# Patient Record
Sex: FEMALE | Race: WHITE | NOT HISPANIC OR LATINO | Employment: FULL TIME | URBAN - METROPOLITAN AREA
[De-identification: names, ages, dates, MRNs, and addresses within clinical notes are randomized per-mention and may not be internally consistent; named-entity substitution may affect disease eponyms.]

---

## 2018-11-28 ENCOUNTER — TELEPHONE (OUTPATIENT)
Dept: UROLOGY | Facility: CLINIC | Age: 58
End: 2018-11-28

## 2018-11-28 NOTE — TELEPHONE ENCOUNTER
----- Message from Cami Miller sent at 11/28/2018  9:06 AM CST -----  Contact: pt  ..Type: Needs Medical Advice    Who Called:  pt  Best Call Back Number:   Additional Information: Pt called to speak with a nurse to see if referral was received from Dr Andrew Morse. Please leave message if no answer  Please call pt to advise  Thanks

## 2018-12-05 ENCOUNTER — LAB VISIT (OUTPATIENT)
Dept: LAB | Facility: HOSPITAL | Age: 58
End: 2018-12-05
Attending: UROLOGY
Payer: COMMERCIAL

## 2018-12-05 ENCOUNTER — OFFICE VISIT (OUTPATIENT)
Dept: UROLOGY | Facility: CLINIC | Age: 58
End: 2018-12-05
Payer: COMMERCIAL

## 2018-12-05 VITALS
HEIGHT: 66 IN | BODY MASS INDEX: 28.61 KG/M2 | HEART RATE: 88 BPM | WEIGHT: 178 LBS | DIASTOLIC BLOOD PRESSURE: 68 MMHG | TEMPERATURE: 98 F | SYSTOLIC BLOOD PRESSURE: 113 MMHG

## 2018-12-05 DIAGNOSIS — N39.0 URINARY TRACT INFECTION WITHOUT HEMATURIA, SITE UNSPECIFIED: Primary | ICD-10-CM

## 2018-12-05 DIAGNOSIS — N39.0 URINARY TRACT INFECTION WITHOUT HEMATURIA, SITE UNSPECIFIED: ICD-10-CM

## 2018-12-05 LAB
BILIRUB SERPL-MCNC: NEGATIVE MG/DL
BLOOD URINE, POC: NEGATIVE
BUN SERPL-MCNC: 12 MG/DL
COLOR, POC UA: NORMAL
CREAT SERPL-MCNC: 0.7 MG/DL
EST. GFR  (AFRICAN AMERICAN): >60 ML/MIN/1.73 M^2
EST. GFR  (NON AFRICAN AMERICAN): >60 ML/MIN/1.73 M^2
GLUCOSE UR QL STRIP: NEGATIVE
KETONES UR QL STRIP: NEGATIVE
LEUKOCYTE ESTERASE URINE, POC: NEGATIVE
NITRITE, POC UA: NEGATIVE
PH, POC UA: 6
POC RESIDUAL URINE VOLUME: 25 ML (ref 0–100)
PROTEIN, POC: NEGATIVE
SPECIFIC GRAVITY, POC UA: 1010
UROBILINOGEN, POC UA: NEGATIVE

## 2018-12-05 PROCEDURE — 81002 URINALYSIS NONAUTO W/O SCOPE: CPT | Mod: S$GLB,,, | Performed by: UROLOGY

## 2018-12-05 PROCEDURE — 82565 ASSAY OF CREATININE: CPT

## 2018-12-05 PROCEDURE — 84520 ASSAY OF UREA NITROGEN: CPT

## 2018-12-05 PROCEDURE — 3008F BODY MASS INDEX DOCD: CPT | Mod: CPTII,S$GLB,, | Performed by: UROLOGY

## 2018-12-05 PROCEDURE — 99999 PR PBB SHADOW E&M-EST. PATIENT-LVL III: CPT | Mod: PBBFAC,,, | Performed by: UROLOGY

## 2018-12-05 PROCEDURE — 36415 COLL VENOUS BLD VENIPUNCTURE: CPT

## 2018-12-05 PROCEDURE — 51798 US URINE CAPACITY MEASURE: CPT | Mod: S$GLB,,, | Performed by: UROLOGY

## 2018-12-05 PROCEDURE — 99204 OFFICE O/P NEW MOD 45 MIN: CPT | Mod: 25,S$GLB,, | Performed by: UROLOGY

## 2018-12-05 RX ORDER — PHENOBARBITAL 30 MG/1
TABLET ORAL
COMMUNITY
End: 2019-02-25 | Stop reason: DRUGHIGH

## 2018-12-05 RX ORDER — LEVOTHYROXINE SODIUM 25 UG/1
TABLET ORAL
Refills: 0 | COMMUNITY
Start: 2018-09-10 | End: 2019-02-25 | Stop reason: DRUGHIGH

## 2018-12-05 RX ORDER — CIPROFLOXACIN 250 MG/1
TABLET, FILM COATED ORAL
Refills: 0 | COMMUNITY
Start: 2018-11-26 | End: 2019-02-25 | Stop reason: ALTCHOICE

## 2018-12-05 NOTE — Clinical Note
December 6, 2018      Andrew Mckeon MD  [address] [facility] [place] [address]           [facility] Urology  [address] [place] [address]  Phone: [phone]  Fax: [phone]          Patient: Tess Pearson   MR Number: [id number]   YOB: 1960   Date of Visit: 12/5/2018       Dear Dr. Andrew Mckeon:    Thank you for referring Tess Pearson to me for evaluation. Attached you will find relevant portions of my assessment and plan of care.    If you have questions, please do not hesitate to call me. I look forward to following Tess Pearson along with you.    Sincerely,    Norbert Cleaning MD    Enclosure  CC:  No Recipients    If you would like to receive this communication electronically, please contact [email or web address] or [phone] to request more information on [employer] access.    For providers and/or their staff who would like to refer a patient to [facility], please contact us through our one-stop-shop provider referral line, [facility] , at [phone].    If you feel you have received this communication in error or would no longer like to receive these types of communications, please e-mail [email or web address]

## 2018-12-06 NOTE — PROGRESS NOTES
Subjective:       Patient ID: Tess Pearson is a 58 y.o. female.    Chief Complaint:   OFFICE NOTE    This is a consultation with Dr. Andrew Mckeon.    CHIEF COMPLAINT:   1.  Chronic recurrent urinary tract infections.  2.  Back pain.    Ms. Pearson is a 58-year-old female who referred that in the last year had  numerous urinary tract infections more than 4.  The infections are usually  characterized by back pain.  She refers to have usually minimal dysuria and  denies any gross hematuria.  The flow seems to be adequate and she feels  that not always can empty the bladder satisfactorily.    The past genitourinary history is completely negative.    In the past gyn history, she has two pregnancies and two vaginal deliveries  that were uneventful.     It is to be noted that in a urine culture performed on 11/20/2018, she had  a positive culture for Enterococcus with multiple resistances.  The  Enterococcus is sensitive to nitrofurantoin.    PAST MEDICAL AND SURGICAL HISTORY:  Well documented in the medical record  and they were reviewed by me during this visit.  Ms. Pearson is a fairly  healthy, takes only few medications.  On further questioning the patient,  it looks that the hygiene of the urethra, genitalia, and rectum are  performed appropriately.  Today, urinalysis is clear and today the patient  referred that feels to have no evidence of any infection.        EOR/HN dd: 12/06/2018 14:03:04 (CST)   td: 12/06/2018 21:31:12 (CST)  Doc ID #[id number]   Job ID #[id number]    CC: Andrew Mckeon M.D.           [facility]  Review of Systems   Constitutional: Negative.  Negative for activity change.   HENT: Negative.  Negative for facial swelling.    Eyes: Negative for discharge.   Respiratory: Negative for cough and shortness of breath.    Cardiovascular: Negative for chest pain and palpitations.   Gastrointestinal: Negative for abdominal distention, blood in stool and constipation.   Genitourinary: Negative for dysuria, flank pain,  frequency, hematuria, urgency and vaginal pain.   Musculoskeletal: Negative.  Negative for arthralgias.   Skin: Negative.    Neurological: Negative.  Negative for dizziness.   Hematological: Negative for adenopathy.   Psychiatric/Behavioral: The patient is not nervous/anxious.        Objective:      Physical Exam   Constitutional: She appears well-developed.   HENT:   Head: Normocephalic.   Eyes: Pupils are equal, round, and reactive to light.   Neck: Normal range of motion.   Cardiovascular: Normal rate.    Pulmonary/Chest: Effort normal.   Abdominal: Soft. She exhibits no distension and no mass. There is no tenderness. Hernia confirmed negative in the right inguinal area and confirmed negative in the left inguinal area.   Genitourinary: Vagina normal. No erythema, tenderness or bleeding in the vagina.   Musculoskeletal: Normal range of motion.   Neurological: She is alert.   Skin: Skin is warm.     Psychiatric: She has a normal mood and affect.     PVR: 10 cc  Assessment:       1. Urinary tract infection without hematuria, site unspecified        Plan:       Urinary tract infection without hematuria, site unspecified  -     POCT URINE DIPSTICK WITHOUT MICROSCOPE  -     Creatinine, serum; Future; Expected date: 12/05/2018  -     CT Abdomen Pelvis W Wo Contrast; Future; Expected date: 12/05/2018  -     BUN; Future; Expected date: 12/05/2018  -     POCT Bladder Scan    Will review above testing, she may need cystoscopy.

## 2018-12-10 ENCOUNTER — HOSPITAL ENCOUNTER (OUTPATIENT)
Dept: RADIOLOGY | Facility: HOSPITAL | Age: 58
Discharge: HOME | End: 2018-12-10
Attending: UROLOGY
Payer: COMMERCIAL

## 2018-12-10 DIAGNOSIS — N39.0 URINARY TRACT INFECTION WITHOUT HEMATURIA, SITE UNSPECIFIED: ICD-10-CM

## 2018-12-11 ENCOUNTER — TELEPHONE (OUTPATIENT)
Dept: UROLOGY | Facility: CLINIC | Age: 58
End: 2018-12-11

## 2018-12-11 NOTE — TELEPHONE ENCOUNTER
----- Message from Shayla Berg sent at 12/11/2018  4:16 PM CST -----  Contact: patient  Type:  Patient Returning Call    Who Called:  patient  Who Left Message for Patient:  Poly  Does the patient know what this is regarding?:  unsure  Best Call Back Number:  [phone]  Additional Information:  She is home for the day, please call back.  Thank you

## 2018-12-11 NOTE — TELEPHONE ENCOUNTER
Spoke with pt about seeing a nurse practitioner tomorrow due to MD having multiple surgeries. Pt states that is fine.

## 2018-12-12 ENCOUNTER — TELEPHONE (OUTPATIENT)
Dept: UROLOGY | Facility: CLINIC | Age: 58
End: 2018-12-12

## 2018-12-12 NOTE — TELEPHONE ENCOUNTER
Informed pt that we have not received the CT results yet. We should be getting them soon. I will call her once provider results the scan. Pt verbalized understanding.

## 2018-12-12 NOTE — TELEPHONE ENCOUNTER
----- Message from Renea Ordaz sent at 12/12/2018  3:30 PM CST -----  Contact: Self  Patient is requesting to speak to Poly about her test results and if she found her record     Please call back [phone] (home)

## 2018-12-14 ENCOUNTER — HOSPITAL ENCOUNTER (OUTPATIENT)
Dept: RADIOLOGY | Facility: HOSPITAL | Age: 58
Discharge: HOME | End: 2018-12-14
Attending: UROLOGY
Payer: COMMERCIAL

## 2018-12-14 DIAGNOSIS — N39.0 URINARY TRACT INFECTION WITHOUT HEMATURIA, SITE UNSPECIFIED: ICD-10-CM

## 2018-12-14 PROCEDURE — 25500020 PHARM REV CODE 255: Performed by: UROLOGY

## 2018-12-14 RX ADMIN — IOHEXOL 120 ML: 350 INJECTION, SOLUTION INTRAVENOUS at 03:12

## 2018-12-17 ENCOUNTER — TELEPHONE (OUTPATIENT)
Dept: UROLOGY | Facility: CLINIC | Age: 58
End: 2018-12-17

## 2018-12-17 NOTE — TELEPHONE ENCOUNTER
----- Message from Analy Walker sent at 12/17/2018  9:57 AM CST -----  Contact: pt  Pt states that she is calling to speak to Nurse Poly about test done last Monday and she is calling to find out what is going on,she has the results and wants to know what the last step is to do,no one has called back about the the result,she doesn't understand some of the results. Had an appointment last week was cancel didn't have the results back yet..[phone] or [phone]

## 2018-12-17 NOTE — TELEPHONE ENCOUNTER
Notified pt of providers result notes. Pt verbalized understanding and voices no concerns at this time.

## 2018-12-27 ENCOUNTER — TELEPHONE (OUTPATIENT)
Dept: UROLOGY | Facility: CLINIC | Age: 58
End: 2018-12-27

## 2018-12-27 NOTE — TELEPHONE ENCOUNTER
Pt states that Dr. Cleaning is not in her insurance network so she is switching to DR. Tarango so she will not have as big of a bill. Informed pt I will let provider know.

## 2018-12-27 NOTE — TELEPHONE ENCOUNTER
----- Message from Renea Ordaz sent at 12/27/2018  1:20 PM CST -----  Contact: Self  Type:  Patient Returning Call    Who Called:  Patient   Who Left Message for Patient:  Poly  Does the patient know what this is regarding?:    Best Call Back Number:  [phone] (home)     Additional Information:

## 2019-01-18 ENCOUNTER — TELEPHONE (OUTPATIENT)
Dept: UROLOGY | Facility: CLINIC | Age: 59
End: 2019-01-18

## 2019-02-25 ENCOUNTER — OFFICE VISIT (OUTPATIENT)
Dept: UROLOGY | Facility: CLINIC | Age: 59
End: 2019-02-25
Payer: COMMERCIAL

## 2019-02-25 DIAGNOSIS — N20.0 NEPHROLITHIASIS: ICD-10-CM

## 2019-02-25 DIAGNOSIS — M54.50 MIDLINE LOW BACK PAIN WITHOUT SCIATICA, UNSPECIFIED CHRONICITY: ICD-10-CM

## 2019-02-25 DIAGNOSIS — N39.0 URINARY TRACT INFECTION WITHOUT HEMATURIA, SITE UNSPECIFIED: Primary | ICD-10-CM

## 2019-02-25 LAB
BILIRUB SERPL-MCNC: NORMAL MG/DL
BLOOD URINE, POC: NORMAL
COLOR, POC UA: YELLOW
GLUCOSE UR QL STRIP: NORMAL
KETONES UR QL STRIP: NORMAL
LEUKOCYTE ESTERASE URINE, POC: NORMAL
NITRITE, POC UA: NORMAL
PH, POC UA: 6
PROTEIN, POC: NORMAL
SPECIFIC GRAVITY, POC UA: 1.01
UROBILINOGEN, POC UA: 0.2

## 2019-02-25 PROCEDURE — 81002 POCT URINE DIPSTICK WITHOUT MICROSCOPE: ICD-10-PCS | Mod: S$GLB,,, | Performed by: UROLOGY

## 2019-02-25 PROCEDURE — 99215 OFFICE O/P EST HI 40 MIN: CPT | Mod: 25,S$GLB,, | Performed by: UROLOGY

## 2019-02-25 PROCEDURE — 81002 URINALYSIS NONAUTO W/O SCOPE: CPT | Mod: S$GLB,,, | Performed by: UROLOGY

## 2019-02-25 PROCEDURE — 99215 PR OFFICE/OUTPT VISIT, EST, LEVL V, 40-54 MIN: ICD-10-PCS | Mod: 25,S$GLB,, | Performed by: UROLOGY

## 2019-02-25 PROCEDURE — 99999 PR PBB SHADOW E&M-EST. PATIENT-LVL III: CPT | Mod: PBBFAC,,, | Performed by: UROLOGY

## 2019-02-25 PROCEDURE — 99999 PR PBB SHADOW E&M-EST. PATIENT-LVL III: ICD-10-PCS | Mod: PBBFAC,,, | Performed by: UROLOGY

## 2019-02-25 RX ORDER — LEVOTHYROXINE SODIUM 50 UG/1
75 CAPSULE ORAL DAILY
COMMUNITY
Start: 2018-12-28 | End: 2020-11-30 | Stop reason: DRUGHIGH

## 2019-02-25 RX ORDER — PHENOBARBITAL 32.4 MG/1
TABLET ORAL
Refills: 5 | COMMUNITY
Start: 2019-02-04

## 2019-02-25 NOTE — PATIENT INSTRUCTIONS
She's had recurrent uti's (culture proven?) but negative urinalysis with only symptoms of back pain and malodorous urines vs bacterial vaginosis (describes fishy smell). She empties her bladder (residual was 35). She has these symptoms today but urine is negative. No indication to send for a culture. No indication for cystocopy.     I recommend that she tries a probiotic daily to replenish the good bacteria in the vagina, continue to drink 3 to 4 bottles of water a day. I suspect that her lower back pain is musculoskeletal but may not be, the ct scan reveals no obvious source for utis or back pain    If she continues to have fish smell on underwear despite probiotics can return to see gyn for vaginal exam/discussion of estrace or however her gyn wants to proceed.     She does have a 2mm right renal stone and can get a f/u xray in a year and f/u then. Counseled when to return to ER or clinic. If she's passing stone can call clinic unless pain uncontrolled or having fever.     Referral to  for back pain    Follow-up in 1 year with xray and renal ultrasound

## 2019-02-25 NOTE — PROGRESS NOTES
[facility] [place]  Staff: MD Emelina    Referring provider and please cc: Andrew Mckeon  PCP: [facility]       Chief Complaint: recurrent uti?     Subjective:        HPI: Tess Pearson is a 58 y.o. female presents with     She was referred initially to  for recurrent uti from Andrew Mckeon. She states she was c/o R lower back pain that hurts with need to void with malodorous urine/foul smelling pad (but no vaginal discharge). no frequency or urgency,  Per patient, she stated she had 4 ua's that were negative but the cultures returned+.  ordered a ctap w wo 12/10/18 which showed a 2mm non-obstructing stone, no hydro on right. She's never passed a stone. She denies any external or musculoskeletal pain. She voids q2-3 hours and wears pads occasionally.  No increased pain with increased fluid intake.     Today she has pain in her lower back and urine is completely negative.    ua void: negative - today she has sx of lower back and malodorous urine  Urine history:  12/5/18 No cx, void: neg, pvr by scan: 25cc        ECOG Status: 0    G2, P 2,  no  Hysterectomy - sees  for pap smears   Gross HematuriaNo    REVIEW OF SYSTEMS:  General ROS: no fevers, no chills  Psychological ROS: no depression  Endocrine ROS: no heat or cold  Respiratory ROS: no SOB  Cardiovascular ROS: no CP  Gastrointestinal ROS: no abdominal pain, no constipation, no diarrhea, noBRBPR  Musculoskeletal ROS: no muscle pain  Neurological ROS: no headaches  Dermatological ROS: no rashes  HEENT: +glasses, no sinus   ROS: per HPI     PMHx:  Past Medical History:   Diagnosis Date    Epilepsy     Kidney stone     Thyroid disease      Kidney stones: Yes - 2mm right non-obstructing     PSHx:  Past Surgical History:   Procedure Laterality Date    ENDOMETRIAL ABLATION      gastric sleeve       Urologic or Gynecologic Surgery: endometrial ablation for vaginal  bleeding    Stents/Valves/Foreign Bodies:   Cardiologist:   Gynecologist: , pap smear yearly     Fam Hx:   malignancies: father with prostate cancer, gyn malignancies: No .   kidney stones: No     Soc Hx:  No tobacco.    No alcohol  Lives in [place], m[place]  :yes  Children: 2  Occupation: at [employer]     Allergies:  Cephalexin and Pseudoephedrine-guaifenesin   Keflex -constipation     Home Medications: reviewed   Urologic Medications: none  Anticoagulation: No    Objective:   There were no vitals filed for this visit.    General:WDWN in NAD  Eyes: PERRLA, normal conjunctiva  Respiratory: no increased work on breathing. No wheezing.   Cardiovascular: No obvious extremity edema. Warm and well perfused.   GI: no palpation of masses. No tenderness. No hepatosplenomegaly to palpation.  Musculoskeletal: normal range of motion of bilateral upper extremities. Normal muscle strength and tone.  Skin: no obvious rashes or lesions. No tightening of skin noted.  Neurologic: CN grossly normal. Normal sensation.   Psychiatric: awake, alert and oriented x 3. Mood and affect normal. Cooperative.    No TTP on lower back     Pelvic exam  Deferred     LABS REVIEW:    No results found for: WBC, HGB, HCT, MCV, PLT  BMP  Lab Results   Component Value Date    BUN 12 12/05/2018    CREATININE 0.7 12/05/2018    ESTGFRAFRICA >60.0 12/05/2018    EGFRNONAA >60.0 12/05/2018         PATHOLOGY REVIEW:  none    RADIOGRAPHIC REVIEW:  ctap w wo 12/10/18  1. Small hiatal hernia.  2. Prior gastric surgery.  3. Small nonobstructing right renal calculus.  4. Small right renal cyst.    Assessment:       1. Urinary tract infection without hematuria, site unspecified    2. Midline low back pain without sciatica, unspecified chronicity    3. Nephrolithiasis          Plan:     She's had recurrent uti's (culture proven?) but negative urinalysis with only symptoms of back pain and malodorous urines vs bacterial vaginosis  (describes fishy smell). She empties her bladder (residual was 35). She has these symptoms today but urine is negative. No indication to send for a culture. No indication for cystocopy.     I recommend that she tries a probiotic daily to replenish the good bacteria in the vagina, continue to drink 3 to 4 bottles of water a day. I suspect that her lower back pain is musculoskeletal but may not be, the ct scan reveals no obvious source for utis or back pain    If she continues to have fish smell on underwear despite probiotics can return to see gyn for vaginal exam/discussion of estrace or however her gyn wants to proceed.     She does have a 2mm right renal stone and can get a f/u xray in a year and f/u then. Counseled when to return to ER or clinic. If she's passing stone can call clinic unless pain uncontrolled or having fever.     Made referral to physical medicine for lower back pain since no urologic cause.     If she continues to have culture proven uti's, return sooner. Will ask  to send us results of ua and culture if she continues to complain of utis.     Follow-up in 1 year with xray and renal ultrasound    Routed Dr.Duplantier Sharri Tarango MD

## 2019-05-28 ENCOUNTER — TELEPHONE (OUTPATIENT)
Dept: UROLOGY | Facility: CLINIC | Age: 59
End: 2019-05-28

## 2019-05-28 NOTE — TELEPHONE ENCOUNTER
Informed pt that I would check into the progress on her billing issue and give her a call back. Pt verbalized understanding.

## 2019-05-28 NOTE — TELEPHONE ENCOUNTER
----- Message from Edith Luque sent at 5/28/2019 12:41 PM CDT -----  Contact: pt  Calling in regards with questions about insurance and billing and scheduling an appointment  And sent message on my chart last week and waiting to hear back from someone and  please advise [phone]

## 2019-05-29 ENCOUNTER — TELEPHONE (OUTPATIENT)
Dept: UROLOGY | Facility: CLINIC | Age: 59
End: 2019-05-29

## 2019-05-29 NOTE — TELEPHONE ENCOUNTER
----- Message from Stanley Knight sent at 5/29/2019 11:02 AM CDT -----  Contact: self   Patient want to speak with a nurse regarding her upcoming appointment have some questions please call back at [phone] (home) or [phone]

## 2019-05-29 NOTE — TELEPHONE ENCOUNTER
Informed pt that the appt was made just for our pre-service department to approve Dr. Cleaning on the [employer] network to disregard it unless needed. It will be canceled once approval is complete. Pt verbalized understanding.

## 2019-06-12 ENCOUNTER — OFFICE VISIT (OUTPATIENT)
Dept: PHYSICAL MEDICINE AND REHAB | Facility: CLINIC | Age: 59
End: 2019-06-12
Payer: COMMERCIAL

## 2019-06-12 ENCOUNTER — HOSPITAL ENCOUNTER (OUTPATIENT)
Dept: RADIOLOGY | Facility: HOSPITAL | Age: 59
Discharge: HOME | End: 2019-06-12
Attending: PHYSICAL MEDICINE & REHABILITATION
Payer: COMMERCIAL

## 2019-06-12 ENCOUNTER — TELEPHONE (OUTPATIENT)
Dept: PHYSICAL MEDICINE AND REHAB | Facility: CLINIC | Age: 59
End: 2019-06-12

## 2019-06-12 VITALS
HEART RATE: 67 BPM | SYSTOLIC BLOOD PRESSURE: 98 MMHG | DIASTOLIC BLOOD PRESSURE: 67 MMHG | HEIGHT: 66 IN | BODY MASS INDEX: 28.61 KG/M2 | WEIGHT: 178 LBS

## 2019-06-12 DIAGNOSIS — M53.3 SACROILIAC DYSFUNCTION: ICD-10-CM

## 2019-06-12 DIAGNOSIS — M53.3 SACROILIAC DYSFUNCTION: Primary | ICD-10-CM

## 2019-06-12 PROCEDURE — 72170 X-RAY EXAM OF PELVIS: CPT | Mod: 26,,, | Performed by: RADIOLOGY

## 2019-06-12 PROCEDURE — 72110 X-RAY EXAM L-2 SPINE 4/>VWS: CPT | Mod: 26,,, | Performed by: RADIOLOGY

## 2019-06-12 PROCEDURE — 99204 PR OFFICE/OUTPT VISIT, NEW, LEVL IV, 45-59 MIN: ICD-10-PCS | Mod: 25,S$GLB,, | Performed by: PHYSICAL MEDICINE & REHABILITATION

## 2019-06-12 PROCEDURE — 3008F BODY MASS INDEX DOCD: CPT | Mod: CPTII,S$GLB,, | Performed by: PHYSICAL MEDICINE & REHABILITATION

## 2019-06-12 PROCEDURE — 98925 PR OSTEOPATHIC MANIP,1-2 BODY REGN: ICD-10-PCS | Mod: S$GLB,,, | Performed by: PHYSICAL MEDICINE & REHABILITATION

## 2019-06-12 PROCEDURE — 72170 XR PELVIS ROUTINE AP: ICD-10-PCS | Mod: 26,,, | Performed by: RADIOLOGY

## 2019-06-12 PROCEDURE — 72110 X-RAY EXAM L-2 SPINE 4/>VWS: CPT | Mod: TC,FY

## 2019-06-12 PROCEDURE — 99999 PR PBB SHADOW E&M-EST. PATIENT-LVL III: ICD-10-PCS | Mod: PBBFAC,,, | Performed by: PHYSICAL MEDICINE & REHABILITATION

## 2019-06-12 PROCEDURE — 3008F PR BODY MASS INDEX (BMI) DOCUMENTED: ICD-10-PCS | Mod: CPTII,S$GLB,, | Performed by: PHYSICAL MEDICINE & REHABILITATION

## 2019-06-12 PROCEDURE — 98925 OSTEOPATH MANJ 1-2 REGIONS: CPT | Mod: S$GLB,,, | Performed by: PHYSICAL MEDICINE & REHABILITATION

## 2019-06-12 PROCEDURE — 99204 OFFICE O/P NEW MOD 45 MIN: CPT | Mod: 25,S$GLB,, | Performed by: PHYSICAL MEDICINE & REHABILITATION

## 2019-06-12 PROCEDURE — 99999 PR PBB SHADOW E&M-EST. PATIENT-LVL III: CPT | Mod: PBBFAC,,, | Performed by: PHYSICAL MEDICINE & REHABILITATION

## 2019-06-12 PROCEDURE — 72110 XR LUMBAR SPINE COMPLETE 5 VIEW: ICD-10-PCS | Mod: 26,,, | Performed by: RADIOLOGY

## 2019-06-12 PROCEDURE — 72170 X-RAY EXAM OF PELVIS: CPT | Mod: TC,FY

## 2019-06-12 NOTE — LETTER
June 12, 2019      Sharri Tarango MD  [address] [place] [address] Dr  [address]  [place] [address]           [place] [facility]  [address] [place] [address] Dr [address]  [place] [address]  Phone: [phone]  Fax: [phone]          Patient: Tess Pearson   MR Number: [id number]   YOB: 1960   Date of Visit: 6/12/2019       Dear Dr. Sharri Tarango:    Thank you for referring Tess Pearson to me for evaluation. Attached you will find relevant portions of my assessment and plan of care.    If you have questions, please do not hesitate to call me. I look forward to following Tess Pearson along with you.    Sincerely,    Josie Gar,     Enclosure  CC:  No Recipients    If you would like to receive this communication electronically, please contact [email or web address] or [phone] to request more information on [employer] Link access.    For providers and/or their staff who would like to refer a patient to [facility], please contact us through our one-stop-shop provider referral line, [facility] , at [phone].    If you feel you have received this communication in error or would no longer like to receive these types of communications, please e-mail [email or web address]

## 2019-06-12 NOTE — PROGRESS NOTES
HPI:  Patient is a 58 y.o. year old female being referred by urology. She has a history of kidney stones. She has had low back pain on the right side for several years.  She attributed her back pain to that. However, urology feels it is msk related and referred her here. She occasionally feels tingling in the anterior of her thighs. She does get pain on the lateral aspect of her right thigh. It is worse w. Pressure. She denies leg weakness or falls. She does have fam. H/o myasthenia gravis.    IMAGING  None    Labs  none    Past Medical History:   Diagnosis Date    Epilepsy     Kidney stone     Thyroid disease      Past Surgical History:   Procedure Laterality Date    ENDOMETRIAL ABLATION      gastric sleeve       No family history on file. +MYASTHENIA GRAVIS  Social History     Socioeconomic History    Marital status:      Spouse name: Not on file    Number of children: Not on file    Years of education: Not on file    Highest education level: Not on file   Occupational History    Not on file   Social Needs    Financial resource strain: Not on file    Food insecurity:     Worry: Not on file     Inability: Not on file    Transportation needs:     Medical: Not on file     Non-medical: Not on file   Tobacco Use    Smoking status: Never Smoker    Smokeless tobacco: Never Used   Substance and Sexual Activity    Alcohol use: No     Frequency: Never    Drug use: No    Sexual activity: Yes     Partners: Male   Lifestyle    Physical activity:     Days per week: Not on file     Minutes per session: Not on file    Stress: Not on file   Relationships    Social connections:     Talks on phone: Not on file     Gets together: Not on file     Attends Anabaptism service: Not on file     Active member of club or organization: Not on file     Attends meetings of clubs or organizations: Not on file     Relationship status: Not on file   Other Topics Concern    Not on file   Social History Narrative    Not  on file       Review of patient's allergies indicates:   Allergen Reactions    Cephalexin     Pseudoephedrine-guaifenesin        Current Outpatient Medications:     levothyroxine (TIROSINT) 50 mcg Cap, , Disp: , Rfl:     PHENobarbital (LUMINAL) 32.4 MG tablet, , Disp: , Rfl: 5      Review of Systems:    No nausea, vomiting, fevers, chills , contipation, diarrhea or sweats,+ weight change, occ neck stiffness, no chest pain, no sob, no change of bowel or bladder habits,no coordination issues      Physical Exam:      Vitals:    06/12/19 0955   BP: 98/67   Pulse: 67     alert and oriented ×4 follows commands answers all questions appropriately,affect wnl  Manual muscle test 5 out of 5 sensation to light touch grossly intact  + excruciate tenderness right sijt, greater trochanters, and QL  Nl gait  -slR left  -GUILLERMINA  Full hip ROM  babinsky down  No clonus  DTR's symmetric 2+  No C/C/E   + unleveled iliac crests  +standing flexion test  Poor psis mobility  +leg length discrepancy resolved after OMT           Assessment:  sijt dysfunction  Superimposed right gluteus medius tendinopathy    Plan:  OMT to realign pelvis today  Will schedule therapy for pelvic alignment at [place]  Recommended prp to right sijt - she will read pamphlet  Will get lspine and pelvis xray  rtc 4wks

## 2019-06-12 NOTE — TELEPHONE ENCOUNTER
----- Message from Vance Bennett sent at 6/12/2019 11:49 AM CDT -----  Type: Needs Medical Advice    Who Called:  Patient  Best Call Back Number: [phone] (home)   Additional Information: Patient would like a call back from the office

## 2019-06-14 ENCOUNTER — TELEPHONE (OUTPATIENT)
Dept: UROLOGY | Facility: CLINIC | Age: 59
End: 2019-06-14

## 2019-06-14 NOTE — TELEPHONE ENCOUNTER
----- Message from Corina Whyte sent at 6/14/2019  1:41 PM CDT -----  Contact: self  [phone]  States that she is calling speak to Marilyn to find out what Dr Cleaning's tax id number is that insurance is filled under. Please call back at [phone]//thank you acc

## 2019-06-14 NOTE — TELEPHONE ENCOUNTER
Informed pt that I will escalate this billing issue to the correct people for this issue to be solved. Pt verbalized understanding

## 2019-06-17 ENCOUNTER — CLINICAL SUPPORT (OUTPATIENT)
Dept: REHABILITATION | Facility: HOSPITAL | Age: 59
End: 2019-06-17
Payer: COMMERCIAL

## 2019-06-17 ENCOUNTER — TELEPHONE (OUTPATIENT)
Dept: PHYSICAL MEDICINE AND REHAB | Facility: CLINIC | Age: 59
End: 2019-06-17

## 2019-06-17 DIAGNOSIS — M62.81 MUSCLE WEAKNESS: ICD-10-CM

## 2019-06-17 PROCEDURE — 97161 PT EVAL LOW COMPLEX 20 MIN: CPT

## 2019-06-17 NOTE — PLAN OF CARE
Physical Therapy Evaluation/Plan of Care    Name: Tess Pearson  Clinic Number: [id number]    Diagnosis:   Encounter Diagnosis   Name Primary?    Muscle weakness      Physician: Josie Gar,*  Treatment Orders: PT Eval and Treat    Past Medical History:   Diagnosis Date    Epilepsy     Kidney stone     Thyroid disease      Current Outpatient Medications   Medication Sig    levothyroxine (TIROSINT) 50 mcg Cap     PHENobarbital (LUMINAL) 32.4 MG tablet      No current facility-administered medications for this visit.      Review of patient's allergies indicates:   Allergen Reactions    Cephalexin     Pseudoephedrine-guaifenesin      Precautions: standard    Evaluation Date: 06/17/19  Visit # authorized: 8  Authorization period: 12/31/19  Plan of care Expiration: 09/12/19    Subjective     Onset/OSMAR: insidious and gradual    Onset Date: chronic, >2 years  Prior Level of Function: independent ADL's and IADL's  Current level of Function: pain limiting functional mobility  Social History: patient lives in single story home with her   Med History: see above  Occupation:     History of Present Illness: Tess is a 58 y.o. female that presents to [facility] secondary to back pain. Tess states she has had progressive right sided back pain for several years with no known injury or inciting incident. She has a history of UTI's which is what she thought was causing pain however is has not gone away.  Tess reports increased stiffness in low back upon waking and with bending, improves some as the day goes on.  She was referred to Dr Gar by her urologist and has had no previous physical therapy or medical treatment for current condition.    Imaging: X-ray taken and revealed: No acute radiographic findings of the pelvis. 1. Mild to moderate multilevel degenerative disc disease.  2. Mild bone demineralization.    Pain: current 5/10, worst 9/10, best 3/10, Tight,  constant  Radicular symptoms: right buttocks  Aggravating factors: worse in AM, sitting too long, standing too long, bending  Easing factors: change in position    Pts goals: decrease pain and increase activity level    No cultural, environmental, or spiritual barriers identified to treatment or learning.    Objective     Observation: Patient is a well developed, well nourished female in NAD.  Posture:  Slouched FH/RS posture with dowagers hump, right iliac crest higher than left    Lumbar Range of Motion:    Degrees Pain   Flexion 40 degrees   Pain at end range        Extension 30 degrees   No change        Left Side Bending 22 degrees No change        Right Side Bending 20 degrees Increase pain        Left rotation   45 degrees No change        Right Rotation   45 degrees No change           Lower Extremity Strength  Right LE  Left LE    Knee extension: 5/5 Knee extension: 5/5   Knee flexion: 5/5 Knee flexion: 5/5   Hip flexion: 5/5 Hip flexion: 5/5   Hip extension:  4-/5 Hip extension: 4-/5   Hip abduction: 4+/5 Hip abduction: 4+/5   Hip adduction: 5/5 Hip adduction 5/5   Ankle dorsiflexion: 5/5 Ankle dorsiflexion: 5/5   Ankle plantarflexion: 5/5 Ankle plantarflexion: 5/5     Special Tests:    -Facet loading: pos on left  -Piriformis Test: B- neg  -Bridge Test: pos  -Fabers: R- pos, L- neg  -Fadirs: R- neg, L- neg  -Hip scour: B- neg  -Leg length discrepancy: pos on R    DTR:   Right Left Comment   Patellar (L3-4) 2+ 2+    Achilles (S1) 1+ 1+      Neuro Dynamic Testing:    Sciatic nerve:      SLR: R = neg     L = neg       Femoral Nerve:    Femoral nerve test: B- neg    Joint Mobility: decreased segmental mobility in lumbar spine and R SIJ.     Palpation: moderate ttp right SIJ. Right PSIS posterior and inferior    Sensation: gross sensation intact    Flexibility:  90/90 SLR = R moderate restriction, L moderate restriction   Ely's test: R moderate restriction, L moderate restriction   Bryan's test: R minimal  restriction, L minimal restriction   Danial test: R minimal restriction, L minimal restriction    Functional Limitations Reports   Tool: Mod Oswestery  Score: 26% impairment    PT Evaluation Completed: Yes  Discussed Plan of Care with patient: Yes    TREATMENT:  Initial eval only today, will initiate treatment at next scheduled visit.    Home Exercises and Patient Education Provided    Education provided re: use of ice/heat as needed at home for pain and tenderness  - progress towards goals   - role of therapy in multi - disciplinary team, goals for therapy  Pt educated on condition, POC, and expectations in therapy.  No spiritual or educational barriers to learning provided    Home exercises: none provided  Pt will be provided HEP during course of treatment with progressions as appropriate. Pt was advised to perform these exercises free of pain, and to stop performing them if pain occurs.   Tess demonstrated good understanding of the education provided.     Assessment     This is a 58 y.o. female referred to outpatient physical therapy and presents with a medical diagnosis of sacroiliac dysfunction and demonstrates limitations as described in the problem list. Pt will benefit from physcial therapy services in order to maximize pain free functional independence. The following goals were discussed with the patient and patient is in agreement with them as to be addressed in the treatment plan. Pt verbally understood the instructions as they were given and demonstrated proper form and technique during therapy. Pt was advise to perform these exercises free of pain, and to stop performing them if pain occurs.     Anticipated barriers to physical therapy: none identified    Medical necessity is demonstrated by the following IMPAIRMENTS/PROBLEM LIST:  Weakness, decreased flexibility, SIJ dysfunction and pain    GOALS:  Long Term Goals: 6 weeks  Pain: Decrease pain to 2/10 to allow for return to   Strength: Improve strength  in hip and core muscles to 5/5 for improved lumbosacral stability  ROM: Improve ROM to lumbar spine of normal limits   Functional scale: Improve score on Mod Oswestry to < / = 14%  Lifting: Lift 20 lbs to waist level, and carry for 20 feet without pain or compensation  Walking: Increase walking distance/duration to 1 mile without pain  Postures: Increase sitting and/or standing duration to 30 minutes without pain   Transfers: Perform supine<>sit and sit to stand transfers without increased pain or limitation  Exercise: demonstrate independence with home exercise program to maintain gains made in therapy.      Plan   Pt will be treated by physical therapy 1-2 times a week for 6 weeks for Pt Education, HEP, therapeutic exercises, neuromuscular re-education, joint mobilizations, modalities prn to achieve established goals. Tess may at times be seen by a PTA as part of the Rehab Team.     Cont PT for 6 weeks.     Cristian Gomez, PT    I certify the need for these services furnished under this plan of treatment and while under my care.______________________________ Physician/Referring Practitioner  Date of Signature

## 2019-06-17 NOTE — TELEPHONE ENCOUNTER
----- Message from Lana Chavez sent at 6/17/2019 10:31 AM CDT -----  Contact: Patient  Type: Needs Medical Advice    Who Called: Patient  Best Call Back Number:  call after 12 pm, she is headed to Therapy  Additional Information: Calling to reschedule her appt on 7/18/19. Please advise

## 2019-06-21 ENCOUNTER — CLINICAL SUPPORT (OUTPATIENT)
Dept: REHABILITATION | Facility: HOSPITAL | Age: 59
End: 2019-06-21
Payer: COMMERCIAL

## 2019-06-21 DIAGNOSIS — M62.81 MUSCLE WEAKNESS: ICD-10-CM

## 2019-06-21 PROCEDURE — 97110 THERAPEUTIC EXERCISES: CPT

## 2019-06-21 NOTE — PATIENT INSTRUCTIONS
Isometric Abdominal        Lying on back with knees bent, tighten stomach by pressing elbows down. Hold ____ seconds.  Repeat ____ times per set. Do ____ sets per session. Do ____ sessions per day.     https://[email or web address]/1086     Copyright © [employer]. All rights reserved.   Bridging        Slowly raise buttocks from floor, keeping stomach tight.  Repeat ____ times per set. Do ____ sets per session. Do ____ sessions per day.     https://[email or web address]/1096     Copyright © [employer]. All rights reserved.   Isometric Gluteals        Tighten buttock muscles.  Repeat ____ times per set. Do ____ sets per session. Do ____ sessions per day.     https://[email or web address]/1126     Copyright © [employer]. All rights reserved.   Straight Leg Raise        Tighten stomach and slowly raise locked right leg ____ inches from floor.  Repeat ____ times per set. Do ____ sets per session. Do ____ sessions per day.     https://[email or web address]     Copyright © [employer]. All rights reserved.   Hamstring Stretch: Active        Support behind right knee. Starting with knee bent, attempt to straighten knee until a comfortable stretch is felt in back of thigh. Hold ____ seconds.  Repeat ____ times per set. Do ____ sets per session. Do ____ sessions per day.     https://"[employer]/158     Copyright © [employer]. All rights reserved.   Knee-to-Chest Stretch: Unilateral        With hand behind right knee, pull knee in to chest until a comfortable stretch is felt in lower back and buttocks. Keep back relaxed. Hold ____ seconds.  Repeat ____ times per set. Do ____ sets per session. Do ____ sessions per day.     https://mEgo/126     Copyright © [employer]. All rights reserved.   Pelvic Tilt        Flatten back by tightening stomach muscles and buttocks.  Repeat ____ times per set. Do ____ sets per session. Do ____ sessions per day.     https://mEgo/134     Copyright © [employer]. All rights reserved.   Strengthening: Hip Adduction - Isometric        With ball or folded pillow  between knees, squeeze knees together. Hold ____ seconds.  Repeat ____ times per set. Do ____ sets per session. Do ____ sessions per day.     [email or web address].us/612     Copyright © [employer]. All rights reserved.

## 2019-06-21 NOTE — PROGRESS NOTES
"                            Physical Therapy Daily Treatment Note   Name: Tess Pearson 1960  MRN: [id number]    Visit Date: 6/21/2019  Visit #: 2 / 8  Authorization period Expiration: 12/31/10    Plan of Care Expiration: 09/12/19  Precautions: standard    Time In: 11:00  Time Out: 12:00  Total 1:1 Treatment Time: 45 min    Treatment Diagnosis:   Encounter Diagnosis   Name Primary?    Muscle weakness      Physician: Josie Gar,*    Subjective   Pt reports: She has been doing better since last visit, pain is 3/10. Still stiff in the morning when she first gets up but improves as day goes on. She is also planning on starting back on walking program.     Pain Scale:  3/10 on VAS currently, 0/10 on VAS post treatment  Pain Location: right low back    Objective   Tess received therapeutic exercises to develop strength, endurance, ROM, flexibility, posture and core stabilization for 40 minutes including:  Supine HS stretch with 20" hold x 5  SKC x 10  DKC with PB x 10  Pelvic tilt x 10  Bridging x 10  Hip add with ball x 10  TA activation with 5" hold x 10  Supine glut sets 5" hold x10  Tiny steps with abd brace x 10  Resisted hip abd with GTB with 5" hold x 10   Prone hip ext x 10   Supine resisted R hip ext with 5" hold x 10    Home Exercises and Education Provided     Education provided re: use of ice/heat as needed at home for pain and tenderness  - progress towards goals   - role of therapy in multi - disciplinary team, goals for therapy  Pt educated on condition, POC, and expectations in therapy.  No spiritual or educational barriers to learning provided     Home exercises: see patient instructions in Epic; TA activation, bridging, SKC, SLR, pelvic tilt, supine glut sets, supine active HS stretch, isometric hip add  Pt will be provided HEP during course of treatment with progressions as appropriate. Pt was advised to perform these exercises free of pain, and to stop performing them if pain occurs. "   Tess demonstrated good understanding of the education provided.     Assessment   Tess tolerated exercise well with good SIJ alignment achieved at end of treatment session. Patient reporting no pain just muscle soreness upon departure.     Pt prognosis is Good. Pt will continue to benefit from skilled outpatient physical therapy to address the deficits listed in the problem list chart on initial evaluation, provide pt/family education and to maximize pt's level of independence in the home and community environment.     Medical necessity is demonstrated by the impairments and functional limitations listed on the Initial Evaluation.     Anticipated barriers to physical therapy: none identified  Pt's spiritual, cultural and educational needs considered and pt agreeable to plan of care and goals.    Plan   Continue with established Plan of Care towards Physical Therapy goals.   Discussed Plan of Care with patient: Yes    Cristian Gomez, PT

## 2019-06-24 ENCOUNTER — CLINICAL SUPPORT (OUTPATIENT)
Dept: REHABILITATION | Facility: HOSPITAL | Age: 59
End: 2019-06-24
Payer: COMMERCIAL

## 2019-06-24 DIAGNOSIS — M62.81 MUSCLE WEAKNESS: ICD-10-CM

## 2019-06-24 PROCEDURE — 97110 THERAPEUTIC EXERCISES: CPT

## 2019-06-24 NOTE — PROGRESS NOTES
"                            Physical Therapy Daily Treatment Note   Name: Tess Pearson 1960  MRN: [id number]    Visit Date: 6/24/2019  Visit #: 3 / 8  Authorization period Expiration: 12/31/10    Plan of Care Expiration: 09/12/19  Precautions: standard    Time In: 10:00  Time Out: 11:00  Total 1:1 Treatment Time: 50 min    Treatment Diagnosis:   Encounter Diagnosis   Name Primary?    Muscle weakness      Physician: Josie Gar,*    Subjective   Pt reports: She is not as stiff in the mornings now and she does not have the pain going across her low back like she was, its more on the right side. Tess reports compliance with HEP.     Pain Scale:  1-2/10 on VAS currently, 0/10 on VAS post treatment  Pain Location: right low back    Objective   Tess received therapeutic exercises to develop strength, endurance, ROM, flexibility, posture and core stabilization for 50 minutes including:  Nustep Lv 1 x 10 minutes  Supine HS stretch with 20" hold x 5  SKC x 15  DKC with PB x 15  Pelvic tilt x 15  Bridging with GTB x 15  Hip add with ball x 15  TA activation with 5" hold x 15  Supine glut sets 5" hold x 15  Tiny steps with abd brace x 15  Resisted alt hip abd with RTB in hooklying x 10  Prone hip ext x 15   Supine resisted R hip ext with 5" hold x 10    Home Exercises and Education Provided     Education provided re: use of ice/heat as needed at home for pain and tenderness, postural care/correction and body mechanics,  - progress towards goals   - role of therapy in multi - disciplinary team, goals for therapy  Pt educated on condition, POC, and expectations in therapy.  No spiritual or educational barriers to learning provided     Home exercises: see patient instructions in Epic; TA activation, bridging, SKC, SLR, pelvic tilt, supine glut sets, supine active HS stretch, isometric hip add  Pt will be provided HEP during course of treatment with progressions as appropriate. Pt was advised to perform these " exercises free of pain, and to stop performing them if pain occurs.   Tess demonstrated good understanding of the education provided.     Assessment   Tess tolerated exercise well progressing with exercise and reps as noted above. Good SIJ alignment at onset of treatment as well as at completion of treatment session. Patient reporting no pain just muscle soreness upon departure.     Pt prognosis is Good. Pt will continue to benefit from skilled outpatient physical therapy to address the deficits listed in the problem list chart on initial evaluation, provide pt/family education and to maximize pt's level of independence in the home and community environment.     Medical necessity is demonstrated by the impairments and functional limitations listed on the Initial Evaluation.     Anticipated barriers to physical therapy: none identified  Pt's spiritual, cultural and educational needs considered and pt agreeable to plan of care and goals.    Plan   Continue with established Plan of Care towards Physical Therapy goals.   Discussed Plan of Care with patient: Yes    Cristian Gomez, PT

## 2019-06-28 ENCOUNTER — CLINICAL SUPPORT (OUTPATIENT)
Dept: REHABILITATION | Facility: HOSPITAL | Age: 59
End: 2019-06-28
Payer: COMMERCIAL

## 2019-06-28 DIAGNOSIS — M62.81 MUSCLE WEAKNESS: ICD-10-CM

## 2019-06-28 PROCEDURE — 97110 THERAPEUTIC EXERCISES: CPT

## 2019-06-28 NOTE — PROGRESS NOTES
"                            Physical Therapy Daily Treatment Note   Name: Tess Pearson 1960  MRN: [id number]    Visit Date: 6/28/2019  Visit #: 4 / 8  Authorization period Expiration: 12/31/10    Plan of Care Expiration: 09/12/19  Precautions: standard    Time In: 10:00  Time Out: 11:00  Total 1:1 Treatment Time: 50 min    Treatment Diagnosis:   Encounter Diagnosis   Name Primary?    Muscle weakness      Physician: Josie Gar,*    Subjective   Pt reports: She is not as stiff in the mornings now and she does not have the pain going across her low back like she was, its more on the right side. Tess reports she has not been doing her exercises like she should.     Pain Scale:  2/10 on VAS currently, 0/10 on VAS post treatment  Pain Location: right low back    Objective   Tess received therapeutic exercises to develop strength, endurance, ROM, flexibility, posture and core stabilization for 50 minutes including:  Nustep Lv 1 x 10 minutes  Supine HS stretch with 20" hold x 5  SKC x 15  DKC with PB x 15  Pelvic tilt x 15  Bridging with GTB x 15  Hip add with ball x 15  TA activation with 5" hold x 15  Supine glut sets 5" hold x 15  Tiny steps with abd brace x 15  Resisted hip abd with GTB in hooklying x 15  Prone hip ext x 15   Supine resisted R hip ext with 5" hold x 10    Home Exercises and Education Provided     Education provided re: use of ice/heat as needed at home for pain and tenderness, postural care/correction and body mechanics,  - progress towards goals   - role of therapy in multi - disciplinary team, goals for therapy  Pt educated on condition, POC, and expectations in therapy.  No spiritual or educational barriers to learning provided     Home exercises: see patient instructions in Epic; TA activation, bridging, SKC, SLR, pelvic tilt, supine glut sets, supine active HS stretch, isometric hip add  Pt will be provided HEP during course of treatment with progressions as appropriate. Pt was " advised to perform these exercises free of pain, and to stop performing them if pain occurs.   Tess demonstrated good understanding of the education provided.     Assessment   Tess tolerated exercise without complication or increase in pain reported. Re enforced importance of consistency with HEP for carry over between treatment sessions and best rehab outcome. Good SIJ alignment at completion of treatment session and patient reporting no pain upon departure. Tses is progressing well towards her goals with no changes to goals at this time.    Pt prognosis is Good. Pt will continue to benefit from skilled outpatient physical therapy to address the deficits listed in the problem list chart on initial evaluation, provide pt/family education and to maximize pt's level of independence in the home and community environment.     Medical necessity is demonstrated by the impairments and functional limitations listed on the Initial Evaluation.     Anticipated barriers to physical therapy: none identified  Pt's spiritual, cultural and educational needs considered and pt agreeable to plan of care and goals.    Plan   Continue with established Plan of Care towards Physical Therapy goals.   Discussed Plan of Care with patient: Yes    Cristian Gomez, PT

## 2019-07-02 ENCOUNTER — CLINICAL SUPPORT (OUTPATIENT)
Dept: REHABILITATION | Facility: HOSPITAL | Age: 59
End: 2019-07-02
Payer: COMMERCIAL

## 2019-07-02 DIAGNOSIS — M62.81 MUSCLE WEAKNESS: ICD-10-CM

## 2019-07-02 PROCEDURE — 97110 THERAPEUTIC EXERCISES: CPT

## 2019-07-02 NOTE — PROGRESS NOTES
"                            Physical Therapy Daily Treatment Note   Name: Tess Pearson 1960  MRN: [id number]    Visit Date: 7/2/2019  Visit #: 5 / 8  Authorization period Expiration: 12/31/10    Plan of Care Expiration: 09/12/19  Precautions: standard    Time In: 1:10 PM  Time Out: 2:00 PM  Total 1:1 Treatment Time: 50 min    Treatment Diagnosis:   Encounter Diagnosis   Name Primary?    Muscle weakness      Physician: Josie Gar,*    Subjective   Pt reports: Pain used to be across entire lower back, but now, it is just on the right side. States she can actually bend over now. Before, pain was so bad that she couldn't put socks on.    Pain Scale:  1/10 on VAS currently, 0/10 on VAS post treatment  Pain Location: right low back    Objective   Tess received therapeutic exercises to develop strength, endurance, ROM, flexibility, posture and core stabilization for 50 minutes including:  Nustep Lv 1 x 10 minutes  Supine HS stretch with 20" hold x 5  SKC x 15  DKC with PB x 15  Pelvic tilt x 15  Bridging with GTB x 15  Hip add with ball with 5" hold x 15  TA activation with 5" hold x 15  Tiny steps x 15  hip abd  in sidelying x 10 ea side  Prone hip ext x 15 ea  Sit to stand x 10    Home Exercises and Education Provided     Education provided re: use of ice/heat as needed at home for pain and tenderness, postural care/correction and body mechanics,  - progress towards goals   - role of therapy in multi - disciplinary team, goals for therapy  Pt educated on condition, POC, and expectations in therapy.  No spiritual or educational barriers to learning provided     Home exercises: see patient instructions in Epic; TA activation, bridging, SKC, SLR, pelvic tilt, supine glut sets, supine active HS stretch, isometric hip add  Pt will be provided HEP during course of treatment with progressions as appropriate. Pt was advised to perform these exercises free of pain, and to stop performing them if pain occurs. "   Tess demonstrated good understanding of the education provided.     Assessment   Tess tolerated exercise without complication or increase in pain reported. Re enforced importance of consistency with HEP for carry over between treatment sessions and best rehab outcome. Good SIJ alignment at completion of treatment session and patient reporting no pain upon departure. Tess is progressing well towards her goals with no changes to goals at this time.    Pt prognosis is Good. Pt will continue to benefit from skilled outpatient physical therapy to address the deficits listed in the problem list chart on initial evaluation, provide pt/family education and to maximize pt's level of independence in the home and community environment.     Medical necessity is demonstrated by the impairments and functional limitations listed on the Initial Evaluation.     Anticipated barriers to physical therapy: none identified  Pt's spiritual, cultural and educational needs considered and pt agreeable to plan of care and goals.    Plan   Continue with established Plan of Care towards Physical Therapy goals.   Discussed Plan of Care with patient: Yes    Mookie Knight, PTA

## 2019-07-05 ENCOUNTER — CLINICAL SUPPORT (OUTPATIENT)
Dept: REHABILITATION | Facility: HOSPITAL | Age: 59
End: 2019-07-05
Payer: COMMERCIAL

## 2019-07-05 DIAGNOSIS — M62.81 MUSCLE WEAKNESS: ICD-10-CM

## 2019-07-05 PROCEDURE — 97110 THERAPEUTIC EXERCISES: CPT

## 2019-07-05 NOTE — PROGRESS NOTES
"                            Physical Therapy Daily Treatment Note   Name: Tess Pearson 1960  MRN: [id number]    Visit Date: 7/5/2019  Visit #: 5 / 8  Authorization period Expiration: 12/31/10    Plan of Care Expiration: 09/12/19  Precautions: standard    Time In: 11:00 AM  Time Out: 11:53 AM  Total 1:1 Treatment Time: 53 min    Treatment Diagnosis:   Encounter Diagnosis   Name Primary?    Muscle weakness      Physician: Josie Gar,*    Subjective   Pt reports: I think I'm going to have to get that procedure done anyway. It's not as bad as it used to be, but I don't think it's as good as it can be.   Pt referring to possible PRP.     Pain Scale:  1/10 on VAS currently, 0/10 on VAS post treatment  Pain Location: right low back    Objective   Tess received therapeutic exercises to develop strength, endurance, ROM, flexibility, posture and core stabilization for 53 minutes including:  Nustep Lv 3 x 10 minutes  Supine HS stretch with 20" hold x 5  SKC x 15  DKC with PB x 15 RTB  LTR with PB x 2 min  Pelvic tilt x 15  Bridging with GTB x 15  Hip add with ball with 5" hold x 15  TA activation with 5" hold x 15  Tiny steps 1 min   hip abd  in sidelying 2 x 10 ea side  Prone hip ext x 15 ea  Sit to stand x 10    Home Exercises and Education Provided     Education provided re: use of ice/heat as needed at home for pain and tenderness, postural care/correction and body mechanics,  - progress towards goals   - role of therapy in multi - disciplinary team, goals for therapy  Pt educated on condition, POC, and expectations in therapy.  No spiritual or educational barriers to learning provided     Home exercises: see patient instructions in Epic; TA activation, bridging, SKC, SLR, pelvic tilt, supine glut sets, supine active HS stretch, isometric hip add  Pt will be provided HEP during course of treatment with progressions as appropriate. Pt was advised to perform these exercises free of pain, and to stop " performing them if pain occurs.   Tess demonstrated good understanding of the education provided.     Assessment   Tess tolerated exercise without complication or increase in pain reported. Patient tolerating increased reps/resistance without complaint.     Pt prognosis is Good. Pt will continue to benefit from skilled outpatient physical therapy to address the deficits listed in the problem list chart on initial evaluation, provide pt/family education and to maximize pt's level of independence in the home and community environment.     Medical necessity is demonstrated by the impairments and functional limitations listed on the Initial Evaluation.     Anticipated barriers to physical therapy: none identified  Pt's spiritual, cultural and educational needs considered and pt agreeable to plan of care and goals.    Plan   Continue with established Plan of Care towards Physical Therapy goals.   Discussed Plan of Care with patient: Yes    Gabby Slaughter, PT

## 2019-07-08 ENCOUNTER — CLINICAL SUPPORT (OUTPATIENT)
Dept: REHABILITATION | Facility: HOSPITAL | Age: 59
End: 2019-07-08
Payer: COMMERCIAL

## 2019-07-08 DIAGNOSIS — M62.81 MUSCLE WEAKNESS: ICD-10-CM

## 2019-07-08 PROCEDURE — 97110 THERAPEUTIC EXERCISES: CPT

## 2019-07-09 ENCOUNTER — CLINICAL SUPPORT (OUTPATIENT)
Dept: REHABILITATION | Facility: HOSPITAL | Age: 59
End: 2019-07-09
Payer: COMMERCIAL

## 2019-07-09 DIAGNOSIS — M62.81 MUSCLE WEAKNESS: ICD-10-CM

## 2019-07-09 PROCEDURE — 97110 THERAPEUTIC EXERCISES: CPT

## 2019-07-09 NOTE — PATIENT INSTRUCTIONS
Functional Quadriceps: Sit to Stand        Sit on edge of chair, feet flat on floor. Stand upright, extending knees fully.  Repeat ____ times per set. Do ____ sets per session. Do ____ sessions per day.     [email or web address]     Copyright © [employer]. All rights reserved.   Strengthening: Hip Extension (Prone)        Tighten muscles on front of left thigh, then lift leg ____ inches from surface, keeping knee locked.  Repeat ____ times per set. Do ____ sets per session. Do ____ sessions per day.     [email or web address]     Copyright © [employer]. All rights reserved.   Strengthening: Hip Abduction (Side-Lying)        Tighten muscles on front of left thigh, then lift leg ____ inches from surface, keeping knee locked.   Repeat ____ times per set. Do ____ sets per session. Do ____ sessions per day.     [email or web address]://[email or web address]/622     Copyright © [employer]. All rights reserved.   Strengthening: Straight Leg Raise (Phase 1)        Tighten muscles on front of right thigh, then lift leg ____ inches from surface, keeping knee locked.   Repeat ____ times per set. Do ____ sets per session. Do ____ sessions per day.     [email or web address]://[email or web address]/614     Copyright © [employer]. All rights reserved.   Strengthening: Resisted Extension        Hold tubing in right hand, arm forward. Pull arm back, elbow straight.  Repeat ____ times per set. Do ____ sets per session. Do ____ sessions per day.     [email or web address]     Copyright © [employer]. All rights reserved.

## 2019-07-09 NOTE — PROGRESS NOTES
"                            Physical Therapy Daily Treatment Note   Name: Tess Pearson 1960  MRN: [id number]    Visit Date: 7/9/2019  Visit #: 8 / 8  Authorization period Expiration: 12/31/10    Plan of Care Expiration: 09/12/19  Precautions: standard    Time In: 11:00 AM  Time Out: 12:05 AM  Total 1:1 Treatment Time: 55 min    Treatment Diagnosis:   Encounter Diagnosis   Name Primary?    Muscle weakness      Physician: Josie Gar,*    Subjective   Pt reports: Her pain level has been consistently staying at 1/10 and she is able to manage symptoms with home exercise and walking activity. She reports she has been trying to be more consistent with her HEP. She will be unable to attend further visits this summer due to a planned trip and having grandchildren staying with her.    Pain Scale:  1/10 on VAS currently, 0/10 on VAS post treatment  Pain Location: right low back    Objective   Tess received therapeutic exercises to develop strength, endurance, ROM, flexibility, posture and core stabilization for 53 minutes including:  Nustep Lv 3 x 10 minutes  Supine HS stretch with 20" hold x 5  SKC x 15  DKC with PB x 15 RTB  LTR with PB x 2 min  Pelvic tilt x 15  Bridging with GTB x 15  Hip add with ball with 5" hold x 15  TA activation with 5" hold x 15  Tiny steps x 15   Hip abd  in sidelying x 15   SLR x 15  Prone hip ext x 15 ea  Sit to stand x 10  Supine resisted R hip ext with 5" hold x 10  B UE rows with black TB x 15  B shoulder ext with black TB x 15    Mod Oswestry: 18% impairment (initial score was 26%)    Home Exercises and Education Provided     Education provided re: use of ice/heat as needed at home for pain and tenderness, postural care/correction and body mechanics,  - progress towards goals   - role of therapy in multi - disciplinary team, goals for therapy  Pt educated on condition, POC, and expectations in therapy.  No spiritual or educational barriers to learning provided     Home " exercises: see patient instructions in Epic; TA activation, bridging, SKC, SLR, pelvic tilt, supine glut sets, supine active HS stretch, isometric hip add. Added: SLR, hip abd in SL, prone hip extension, chair squats, and bilateral UE rows and shoulder ext with black TB.  Pt will be provided HEP during course of treatment with progressions as appropriate. Pt was advised to perform these exercises free of pain, and to stop performing them if pain occurs.   Tess demonstrated good understanding of the education provided.     Assessment   Tess tolerated exercise without complication or increase in pain reported. Patient will be unable to attend further physical therapy sessions this summer secondary to planned trips and having grandchildren staying with her but will follow up with referring MD on July 18th. She has made good progress towards her goals achieving 6 out of 9 goals set at initial eval. She continues to have good rehab potential and will continue to benefit from skilled physical therapy to address remaining goals, progress HEP, and maximize independence in home and community environment. Patient verbalized understanding of importance of consistency with HEP for symptom management as well as to maintain gains made in therapy. She also verbalized understanding of PT recommendation that she return to physical therapy for 2 more weeks when her schedule allows.    Medical necessity is demonstrated by the impairments and functional limitations listed on the Initial Evaluation.     Anticipated barriers to physical therapy: none identified  Pt's spiritual, cultural and educational needs considered and pt agreeable to plan of care and goals.    GOALS:  Long Term Goals: 6 weeks  Pain: Decrease pain to 2/10 to allow for return to unrestricted ADL's and recreational activities Goal met 07/09/19  Strength: Improve strength in hip and core muscles to 5/5 for improved lumbosacral stability  Goal not met 07/09/19  ROM:  Improve ROM to lumbar spine to normal limits  Goal met 07/09/19  Functional scale: Improve score on Mod Oswestry to < / = 14% Goal not met 07/09/19, patient scored 18% when tested today  Lifting: Lift 20 lbs to waist level, and carry for 20 feet without pain or compensation Goal not met 07/09/19  Walking: Increase walking distance/duration to 1 mile without pain  Goal met 07/09/19  Postures: Increase sitting and/or standing duration to 30 minutes without pain  Goal met 07/09/19  Transfers: Perform supine<>sit and sit to stand transfers without increased pain or limitationm Goal met 07/09/19  Exercise: demonstrate independence with home exercise program to maintain gains made in therapy.  Goal met 07/09/19     Plan   Patient will continue with established HEP with recommendations for continued physical therapy after follow up MD appointment later this month.  Discussed Plan of Care with patient: Yes    Cristian Gomez, PT

## 2019-07-18 ENCOUNTER — OFFICE VISIT (OUTPATIENT)
Dept: PHYSICAL MEDICINE AND REHAB | Facility: CLINIC | Age: 59
End: 2019-07-18
Payer: COMMERCIAL

## 2019-07-18 VITALS
WEIGHT: 177.94 LBS | SYSTOLIC BLOOD PRESSURE: 108 MMHG | HEIGHT: 66 IN | DIASTOLIC BLOOD PRESSURE: 68 MMHG | BODY MASS INDEX: 28.6 KG/M2 | HEART RATE: 83 BPM

## 2019-07-18 DIAGNOSIS — M25.551 RIGHT HIP PAIN: Primary | ICD-10-CM

## 2019-07-18 PROCEDURE — 99999 PR PBB SHADOW E&M-EST. PATIENT-LVL III: ICD-10-PCS | Mod: PBBFAC,,, | Performed by: PHYSICAL MEDICINE & REHABILITATION

## 2019-07-18 PROCEDURE — 99214 OFFICE O/P EST MOD 30 MIN: CPT | Mod: S$GLB,,, | Performed by: PHYSICAL MEDICINE & REHABILITATION

## 2019-07-18 PROCEDURE — 99214 PR OFFICE/OUTPT VISIT, EST, LEVL IV, 30-39 MIN: ICD-10-PCS | Mod: S$GLB,,, | Performed by: PHYSICAL MEDICINE & REHABILITATION

## 2019-07-18 PROCEDURE — 3008F BODY MASS INDEX DOCD: CPT | Mod: CPTII,S$GLB,, | Performed by: PHYSICAL MEDICINE & REHABILITATION

## 2019-07-18 PROCEDURE — 99999 PR PBB SHADOW E&M-EST. PATIENT-LVL III: CPT | Mod: PBBFAC,,, | Performed by: PHYSICAL MEDICINE & REHABILITATION

## 2019-07-18 PROCEDURE — 3008F PR BODY MASS INDEX (BMI) DOCUMENTED: ICD-10-PCS | Mod: CPTII,S$GLB,, | Performed by: PHYSICAL MEDICINE & REHABILITATION

## 2019-07-18 NOTE — PROGRESS NOTES
HPI:  Patient is a 58 y.o. year old female w. sijt dysfunction. She has felt some improvement w. Physical therapy. I had discussed PRP but she cannot afford this at this time.  She is still having pain radiating to her right buttocks    Past Medical History:   Diagnosis Date    Epilepsy     Kidney stone     Thyroid disease      Past Surgical History:   Procedure Laterality Date    ENDOMETRIAL ABLATION      gastric sleeve       No family history on file.  Social History     Socioeconomic History    Marital status:      Spouse name: Not on file    Number of children: Not on file    Years of education: Not on file    Highest education level: Not on file   Occupational History    Not on file   Social Needs    Financial resource strain: Not on file    Food insecurity:     Worry: Not on file     Inability: Not on file    Transportation needs:     Medical: Not on file     Non-medical: Not on file   Tobacco Use    Smoking status: Never Smoker    Smokeless tobacco: Never Used   Substance and Sexual Activity    Alcohol use: No     Frequency: Never    Drug use: No    Sexual activity: Yes     Partners: Male   Lifestyle    Physical activity:     Days per week: Not on file     Minutes per session: Not on file    Stress: Not on file   Relationships    Social connections:     Talks on phone: Not on file     Gets together: Not on file     Attends Alevism service: Not on file     Active member of club or organization: Not on file     Attends meetings of clubs or organizations: Not on file     Relationship status: Not on file   Other Topics Concern    Not on file   Social History Narrative    Not on file       Review of patient's allergies indicates:   Allergen Reactions    Cephalexin     Pseudoephedrine-guaifenesin        Current Outpatient Medications:     levothyroxine (TIROSINT) 50 mcg Cap, , Disp: , Rfl:     PHENobarbital (LUMINAL) 32.4 MG tablet, , Disp: , Rfl: 5      Review of Systems  No  nausea, vomiting, fevers, Chills , contipation, diarrhea or sweats    Physical Exam:      Vitals:    07/18/19 1335   BP: 108/68   Pulse: 83       alert and oriented ×4 follows commands answers all questions appropriately,affect wnl  Manual muscle test 5 out of 5 sensation to light touch grossly intact  +  tenderness right sijt and right iliac crest  Nl gait  -GUILLERMINA  Full hip ROM  babinsky down  No clonus  DTR's symmetric 2+  No C/C/E   + unleveled iliac crests  -standing flexion test  good psis mobility         Assessment:  sijt dysfunction  Superimposed cluneal neuralgia right    Plan:  Schedule Therapeutic diagnostic block+hydrodissection of right cluneal nerve  Cont physical therapy

## 2020-09-22 DIAGNOSIS — R74.8 ABNORMAL SERUM LEVEL OF ALKALINE PHOSPHATASE: Primary | ICD-10-CM

## 2020-09-28 ENCOUNTER — HOSPITAL ENCOUNTER (OUTPATIENT)
Dept: RADIOLOGY | Facility: HOSPITAL | Age: 60
Discharge: HOME | End: 2020-09-28
Attending: FAMILY MEDICINE
Payer: COMMERCIAL

## 2020-09-28 DIAGNOSIS — R74.8 ABNORMAL SERUM LEVEL OF ALKALINE PHOSPHATASE: ICD-10-CM

## 2020-09-28 PROCEDURE — 77080 DXA BONE DENSITY AXIAL: CPT | Mod: 26,,, | Performed by: RADIOLOGY

## 2020-09-28 PROCEDURE — 78306 BONE IMAGING WHOLE BODY: CPT | Mod: 26,,, | Performed by: RADIOLOGY

## 2020-09-28 PROCEDURE — 77080 DEXA BONE DENSITY SPINE HIP: ICD-10-PCS | Mod: 26,,, | Performed by: RADIOLOGY

## 2020-09-28 PROCEDURE — 77080 DXA BONE DENSITY AXIAL: CPT | Mod: TC

## 2020-09-28 PROCEDURE — A9503 TC99M MEDRONATE: HCPCS

## 2020-09-28 PROCEDURE — 78306 NM BONE SCAN WHOLE BODY: ICD-10-PCS | Mod: 26,,, | Performed by: RADIOLOGY

## 2020-12-11 ENCOUNTER — HOSPITAL ENCOUNTER (OUTPATIENT)
Dept: RADIOLOGY | Facility: HOSPITAL | Age: 60
Discharge: HOME | End: 2020-12-11
Attending: OBSTETRICS & GYNECOLOGY
Payer: COMMERCIAL

## 2020-12-11 ENCOUNTER — HOSPITAL ENCOUNTER (OUTPATIENT)
Dept: CARDIOLOGY | Facility: HOSPITAL | Age: 60
Discharge: HOME | End: 2020-12-11
Attending: OBSTETRICS & GYNECOLOGY
Payer: COMMERCIAL

## 2020-12-11 DIAGNOSIS — N84.0 ENDOMETRIAL POLYP: ICD-10-CM

## 2020-12-11 DIAGNOSIS — Z01.818 PREOP TESTING: ICD-10-CM

## 2020-12-11 PROCEDURE — 93005 ELECTROCARDIOGRAM TRACING: CPT

## 2020-12-11 NOTE — H&P (VIEW-ONLY)
Subjective:       Patient ID: Tess Pearson is a 60 y.o. female.    Chief Complaint:  Pre-op Exam      History of Present Illness  60-year-old patient here today for preop appointment.  Patient was having an enlarged uterus and a transvaginal ultrasound was performed.  She was noted to have a thickened endometrial stripe.  She was also noted to have fibroids.  She denies any postmenopausal bleeding.  It appears as though the patient has an endometrial polyp.  Consent signed and on chart.  Risks and benefits explained.  All questions were answered.  We will proceed with D&C hysteroscopy possible MyoSure.      Past Medical History:   Diagnosis Date    Epilepsy     Kidney stone     Thyroid disease      Past Surgical History:   Procedure Laterality Date    ENDOMETRIAL ABLATION      gastric sleeve       Social History     Tobacco Use    Smoking status: Never Smoker    Smokeless tobacco: Never Used   Substance Use Topics    Alcohol use: No     Frequency: Never    Drug use: No          Current Outpatient Medications:     furosemide (LASIX) 20 MG tablet, Take 1 tablet (20 mg total) by mouth once daily. for 10 days, Disp: 10 tablet, Rfl: 0    levothyroxine (SYNTHROID) 75 MCG tablet, Take 75 mcg by mouth once daily., Disp: , Rfl:     PHENobarbital (LUMINAL) 32.4 MG tablet, , Disp: , Rfl: 5   Review of patient's allergies indicates:   Allergen Reactions    Cephalexin     Pseudoephedrine-guaifenesin         GYN & OB History  No LMP recorded. Patient is postmenopausal.   Date of Last Pap: No result found    OB History   No obstetric history on file.       Review of Systems  Review of Systems   Constitutional: Negative for activity change, fatigue and unexpected weight change.   Eyes: Negative for visual disturbance.   Respiratory: Negative for shortness of breath.    Cardiovascular: Negative for chest pain and palpitations.   Gastrointestinal: Negative for abdominal pain, bloating, blood in stool, constipation  and diarrhea.   Endocrine: Negative for hot flashes.   Genitourinary: Negative for bladder incontinence, decreased libido, hot flashes, urinary incontinence, postmenopausal bleeding and vaginal dryness.   Musculoskeletal: Negative for back pain, joint swelling and myalgias.   Integumentary:  Negative for hair changes, nipple discharge and breast skin changes.   Neurological: Negative for syncope and headaches.   Hematological: Negative for adenopathy. Does not bruise/bleed easily.   Psychiatric/Behavioral: Negative for depression and sleep disturbance. The patient is not nervous/anxious.    Breast: Negative for breast self exam, lump, nipple discharge and skin changes          Objective:    Physical Exam:   Constitutional: She is oriented to person, place, and time. She appears well-developed and well-nourished.    HENT:   Head: Normocephalic and atraumatic.    Eyes: EOM are normal.    Neck: Normal range of motion and full passive range of motion without pain. Neck supple.    Cardiovascular: Normal rate, regular rhythm and normal heart sounds.     Pulmonary/Chest: Effort normal and breath sounds normal.        Abdominal: Soft. Bowel sounds are normal.     Genitourinary:    Vagina normal.   There is no lesion on the right labia. There is no lesion on the left labia. Uterus is enlarged and hosting fibroids. Cervix is normal. Right adnexum displays no mass, no tenderness and no fullness. Left adnexum displays no mass, no tenderness and no fullness. Vaginal cuff normal.Cervix exhibits no motion tenderness.        Uterus Size: 10 cm   Musculoskeletal: Normal range of motion.       Neurological: She is alert and oriented to person, place, and time. She has normal strength.    Skin: Skin is warm and dry.    Psychiatric: She has a normal mood and affect. Her speech is normal and behavior is normal.          Assessment:        1. Preop testing    2. Fibroids, intramural    3. Endometrial polyp               Plan:      Preop  testing  -     CBC Auto Differential; Future; Expected date: 12/11/2020  -     X-Ray Chest 1 View Pre-OP; Future; Expected date: 12/11/2020  -     EKG 12-lead; Future  -     COVID-19 Routine Screening; Future; Expected date: 12/11/2020    Fibroids, intramural  -     Full code; Standing  -     Insert peripheral IV; Standing  -     Notify Physician - Potential Need of Opioid Reversal; Standing  -     Diet NPO; Standing    Endometrial polyp  -     CBC Auto Differential; Future; Expected date: 12/11/2020  -     X-Ray Chest 1 View Pre-OP; Future; Expected date: 12/11/2020  -     EKG 12-lead; Future  -     COVID-19 Routine Screening; Future; Expected date: 12/11/2020  -     Full code; Standing  -     Insert peripheral IV; Standing  -     Notify Physician - Potential Need of Opioid Reversal; Standing  -     Diet NPO; Standing    Other orders  -     Ambulate; Standing          Follow up in 5 days (on 12/16/2020) for D&C Hscope GTA.

## 2020-12-13 ENCOUNTER — LAB VISIT (OUTPATIENT)
Dept: FAMILY MEDICINE | Facility: CLINIC | Age: 60
End: 2020-12-13
Payer: COMMERCIAL

## 2020-12-13 DIAGNOSIS — Z01.818 PREOP TESTING: ICD-10-CM

## 2020-12-13 DIAGNOSIS — N84.0 ENDOMETRIAL POLYP: ICD-10-CM

## 2020-12-13 PROCEDURE — U0003 INFECTIOUS AGENT DETECTION BY NUCLEIC ACID (DNA OR RNA); SEVERE ACUTE RESPIRATORY SYNDROME CORONAVIRUS 2 (SARS-COV-2) (CORONAVIRUS DISEASE [COVID-19]), AMPLIFIED PROBE TECHNIQUE, MAKING USE OF HIGH THROUGHPUT TECHNOLOGIES AS DESCRIBED BY CMS-2020-01-R: HCPCS

## 2020-12-14 LAB — SARS-COV-2 RNA RESP QL NAA+PROBE: NOT DETECTED

## 2020-12-16 ENCOUNTER — HOSPITAL ENCOUNTER (OUTPATIENT)
Facility: HOSPITAL | Age: 60
Discharge: HOME | End: 2020-12-16
Attending: OBSTETRICS & GYNECOLOGY | Admitting: OBSTETRICS & GYNECOLOGY
Payer: COMMERCIAL

## 2020-12-16 ENCOUNTER — ANESTHESIA (OUTPATIENT)
Dept: SURGERY | Facility: HOSPITAL | Age: 60
End: 2020-12-16
Payer: COMMERCIAL

## 2020-12-16 ENCOUNTER — ANESTHESIA EVENT (OUTPATIENT)
Dept: SURGERY | Facility: HOSPITAL | Age: 60
End: 2020-12-16
Payer: COMMERCIAL

## 2020-12-16 DIAGNOSIS — Z01.818 PREOP TESTING: ICD-10-CM

## 2020-12-16 DIAGNOSIS — N84.0 ENDOMETRIAL POLYP: Primary | ICD-10-CM

## 2020-12-16 DIAGNOSIS — D25.1 FIBROIDS, INTRAMURAL: ICD-10-CM

## 2020-12-16 DIAGNOSIS — R93.89 THICKENED ENDOMETRIUM: ICD-10-CM

## 2020-12-16 DIAGNOSIS — N81.4 CYSTOCELE WITH UTERINE PROLAPSE: ICD-10-CM

## 2020-12-16 PROCEDURE — D9220A PRA ANESTHESIA: Mod: CRNA,,, | Performed by: NURSE ANESTHETIST, CERTIFIED REGISTERED

## 2020-12-16 PROCEDURE — 36000707: Performed by: OBSTETRICS & GYNECOLOGY

## 2020-12-16 PROCEDURE — 36000706: Performed by: OBSTETRICS & GYNECOLOGY

## 2020-12-16 PROCEDURE — 63600175 PHARM REV CODE 636 W HCPCS: Performed by: NURSE ANESTHETIST, CERTIFIED REGISTERED

## 2020-12-16 PROCEDURE — 25000003 PHARM REV CODE 250

## 2020-12-16 PROCEDURE — D9220A PRA ANESTHESIA: ICD-10-PCS | Mod: CRNA,,, | Performed by: NURSE ANESTHETIST, CERTIFIED REGISTERED

## 2020-12-16 PROCEDURE — 25000003 PHARM REV CODE 250: Performed by: OBSTETRICS & GYNECOLOGY

## 2020-12-16 PROCEDURE — 37000008 HC ANESTHESIA 1ST 15 MINUTES: Performed by: OBSTETRICS & GYNECOLOGY

## 2020-12-16 PROCEDURE — 71000033 HC RECOVERY, INTIAL HOUR: Performed by: OBSTETRICS & GYNECOLOGY

## 2020-12-16 PROCEDURE — 63600175 PHARM REV CODE 636 W HCPCS: Performed by: ANESTHESIOLOGY

## 2020-12-16 PROCEDURE — 37000009 HC ANESTHESIA EA ADD 15 MINS: Performed by: OBSTETRICS & GYNECOLOGY

## 2020-12-16 PROCEDURE — C1782 MORCELLATOR: HCPCS | Performed by: OBSTETRICS & GYNECOLOGY

## 2020-12-16 PROCEDURE — 88305 TISSUE EXAM BY PATHOLOGIST: CPT | Mod: 26,,, | Performed by: PATHOLOGY

## 2020-12-16 PROCEDURE — 71000015 HC POSTOP RECOV 1ST HR: Performed by: OBSTETRICS & GYNECOLOGY

## 2020-12-16 PROCEDURE — D9220A PRA ANESTHESIA: ICD-10-PCS | Mod: ANES,,, | Performed by: ANESTHESIOLOGY

## 2020-12-16 PROCEDURE — 27201423 OPTIME MED/SURG SUP & DEVICES STERILE SUPPLY: Performed by: OBSTETRICS & GYNECOLOGY

## 2020-12-16 PROCEDURE — 88305 TISSUE EXAM BY PATHOLOGIST: CPT | Performed by: PATHOLOGY

## 2020-12-16 PROCEDURE — 88305 TISSUE EXAM BY PATHOLOGIST: ICD-10-PCS | Mod: 26,,, | Performed by: PATHOLOGY

## 2020-12-16 PROCEDURE — D9220A PRA ANESTHESIA: Mod: ANES,,, | Performed by: ANESTHESIOLOGY

## 2020-12-16 RX ORDER — ONDANSETRON 2 MG/ML
INJECTION INTRAMUSCULAR; INTRAVENOUS
Status: DISCONTINUED | OUTPATIENT
Start: 2020-12-16 | End: 2020-12-16

## 2020-12-16 RX ORDER — MIDAZOLAM HYDROCHLORIDE 1 MG/ML
INJECTION INTRAMUSCULAR; INTRAVENOUS
Status: DISCONTINUED | OUTPATIENT
Start: 2020-12-16 | End: 2020-12-16

## 2020-12-16 RX ORDER — POLYETHYLENE GLYCOL 3350 17 G/17G
17 POWDER, FOR SOLUTION ORAL DAILY
Start: 2020-12-16 | End: 2021-07-01

## 2020-12-16 RX ORDER — IBUPROFEN 800 MG/1
800 TABLET ORAL EVERY 8 HOURS PRN
Qty: 30 TABLET | Refills: 1 | Status: SHIPPED | OUTPATIENT
Start: 2020-12-16 | End: 2021-07-01

## 2020-12-16 RX ORDER — DIPHENHYDRAMINE HCL 25 MG
25 CAPSULE ORAL EVERY 4 HOURS PRN
Status: DISCONTINUED | OUTPATIENT
Start: 2020-12-16 | End: 2020-12-16 | Stop reason: HOSPADM

## 2020-12-16 RX ORDER — MORPHINE SULFATE 4 MG/ML
2 INJECTION, SOLUTION INTRAMUSCULAR; INTRAVENOUS
Status: DISCONTINUED | OUTPATIENT
Start: 2020-12-16 | End: 2020-12-16 | Stop reason: HOSPADM

## 2020-12-16 RX ORDER — SODIUM CHLORIDE 9 MG/ML
INJECTION, SOLUTION INTRAVENOUS CONTINUOUS
Status: DISCONTINUED | OUTPATIENT
Start: 2020-12-16 | End: 2020-12-16 | Stop reason: HOSPADM

## 2020-12-16 RX ORDER — SODIUM CHLORIDE, SODIUM LACTATE, POTASSIUM CHLORIDE, CALCIUM CHLORIDE 600; 310; 30; 20 MG/100ML; MG/100ML; MG/100ML; MG/100ML
INJECTION, SOLUTION INTRAVENOUS CONTINUOUS
Status: DISCONTINUED | OUTPATIENT
Start: 2020-12-16 | End: 2020-12-16 | Stop reason: HOSPADM

## 2020-12-16 RX ORDER — DEXAMETHASONE SODIUM PHOSPHATE 4 MG/ML
INJECTION, SOLUTION INTRA-ARTICULAR; INTRALESIONAL; INTRAMUSCULAR; INTRAVENOUS; SOFT TISSUE
Status: DISCONTINUED | OUTPATIENT
Start: 2020-12-16 | End: 2020-12-16

## 2020-12-16 RX ORDER — FAMOTIDINE 10 MG/ML
20 INJECTION INTRAVENOUS ONCE
Status: DISCONTINUED | OUTPATIENT
Start: 2020-12-16 | End: 2020-12-16 | Stop reason: HOSPADM

## 2020-12-16 RX ORDER — PROPOFOL 10 MG/ML
VIAL (ML) INTRAVENOUS
Status: DISCONTINUED | OUTPATIENT
Start: 2020-12-16 | End: 2020-12-16

## 2020-12-16 RX ORDER — ONDANSETRON 2 MG/ML
4 INJECTION INTRAMUSCULAR; INTRAVENOUS DAILY PRN
Status: DISCONTINUED | OUTPATIENT
Start: 2020-12-16 | End: 2020-12-16 | Stop reason: HOSPADM

## 2020-12-16 RX ORDER — MORPHINE SULFATE 4 MG/ML
2 INJECTION, SOLUTION INTRAMUSCULAR; INTRAVENOUS EVERY 5 MIN PRN
Status: DISCONTINUED | OUTPATIENT
Start: 2020-12-16 | End: 2020-12-16 | Stop reason: HOSPADM

## 2020-12-16 RX ORDER — FAMOTIDINE 10 MG/ML
INJECTION INTRAVENOUS
Status: COMPLETED
Start: 2020-12-16 | End: 2020-12-16

## 2020-12-16 RX ORDER — SODIUM CHLORIDE, SODIUM LACTATE, POTASSIUM CHLORIDE, CALCIUM CHLORIDE 600; 310; 30; 20 MG/100ML; MG/100ML; MG/100ML; MG/100ML
125 INJECTION, SOLUTION INTRAVENOUS CONTINUOUS
Status: DISCONTINUED | OUTPATIENT
Start: 2020-12-16 | End: 2020-12-16 | Stop reason: HOSPADM

## 2020-12-16 RX ORDER — ONDANSETRON 4 MG/1
8 TABLET, ORALLY DISINTEGRATING ORAL EVERY 8 HOURS PRN
Status: DISCONTINUED | OUTPATIENT
Start: 2020-12-16 | End: 2020-12-16 | Stop reason: HOSPADM

## 2020-12-16 RX ORDER — HYDROMORPHONE HYDROCHLORIDE 2 MG/ML
1 INJECTION, SOLUTION INTRAMUSCULAR; INTRAVENOUS; SUBCUTANEOUS EVERY 4 HOURS PRN
Status: DISCONTINUED | OUTPATIENT
Start: 2020-12-16 | End: 2020-12-16 | Stop reason: HOSPADM

## 2020-12-16 RX ORDER — AMOXICILLIN 250 MG
1 CAPSULE ORAL 2 TIMES DAILY
COMMUNITY
Start: 2020-12-16 | End: 2021-07-01

## 2020-12-16 RX ORDER — MORPHINE SULFATE 10 MG/ML
INJECTION INTRAMUSCULAR; INTRAVENOUS; SUBCUTANEOUS
Status: DISCONTINUED | OUTPATIENT
Start: 2020-12-16 | End: 2020-12-16

## 2020-12-16 RX ORDER — DIPHENHYDRAMINE HYDROCHLORIDE 50 MG/ML
25 INJECTION INTRAMUSCULAR; INTRAVENOUS EVERY 4 HOURS PRN
Status: DISCONTINUED | OUTPATIENT
Start: 2020-12-16 | End: 2020-12-16 | Stop reason: HOSPADM

## 2020-12-16 RX ORDER — FAMOTIDINE 10 MG/ML
20 INJECTION INTRAVENOUS
Status: COMPLETED | OUTPATIENT
Start: 2020-12-16 | End: 2020-12-16

## 2020-12-16 RX ORDER — IBUPROFEN 600 MG/1
600 TABLET ORAL EVERY 6 HOURS PRN
Status: DISCONTINUED | OUTPATIENT
Start: 2020-12-16 | End: 2020-12-16 | Stop reason: HOSPADM

## 2020-12-16 RX ORDER — LIDOCAINE HYDROCHLORIDE 10 MG/ML
1 INJECTION, SOLUTION EPIDURAL; INFILTRATION; INTRACAUDAL; PERINEURAL ONCE
Status: DISCONTINUED | OUTPATIENT
Start: 2020-12-16 | End: 2020-12-16 | Stop reason: HOSPADM

## 2020-12-16 RX ORDER — SUCCINYLCHOLINE CHLORIDE 20 MG/ML
INJECTION INTRAMUSCULAR; INTRAVENOUS
Status: DISCONTINUED | OUTPATIENT
Start: 2020-12-16 | End: 2020-12-16

## 2020-12-16 RX ORDER — DOXYCYCLINE HYCLATE 100 MG
100 TABLET ORAL 2 TIMES DAILY
Status: COMPLETED | OUTPATIENT
Start: 2020-12-16 | End: 2020-12-16

## 2020-12-16 RX ORDER — DOXYCYCLINE HYCLATE 100 MG
200 TABLET ORAL ONCE
Status: DISCONTINUED | OUTPATIENT
Start: 2020-12-16 | End: 2020-12-16 | Stop reason: HOSPADM

## 2020-12-16 RX ORDER — MORPHINE SULFATE 4 MG/ML
3 INJECTION, SOLUTION INTRAMUSCULAR; INTRAVENOUS
Status: DISCONTINUED | OUTPATIENT
Start: 2020-12-16 | End: 2020-12-16 | Stop reason: HOSPADM

## 2020-12-16 RX ORDER — DOXYCYCLINE HYCLATE 100 MG
TABLET ORAL
Status: COMPLETED
Start: 2020-12-16 | End: 2020-12-16

## 2020-12-16 RX ORDER — OXYCODONE AND ACETAMINOPHEN 10; 325 MG/1; MG/1
1 TABLET ORAL EVERY 6 HOURS PRN
Qty: 14 TABLET | Refills: 0 | Status: SHIPPED | OUTPATIENT
Start: 2020-12-16 | End: 2021-07-01

## 2020-12-16 RX ORDER — DIPHENHYDRAMINE HYDROCHLORIDE 50 MG/ML
12.5 INJECTION INTRAMUSCULAR; INTRAVENOUS
Status: DISCONTINUED | OUTPATIENT
Start: 2020-12-16 | End: 2020-12-16 | Stop reason: HOSPADM

## 2020-12-16 RX ADMIN — DOXYCYCLINE HYCLATE 100 MG: 100 TABLET, COATED ORAL at 11:12

## 2020-12-16 RX ADMIN — DEXAMETHASONE SODIUM PHOSPHATE 8 MG: 4 INJECTION, SOLUTION INTRAMUSCULAR; INTRAVENOUS at 10:12

## 2020-12-16 RX ADMIN — PROPOFOL 200 MG: 10 INJECTION, EMULSION INTRAVENOUS at 10:12

## 2020-12-16 RX ADMIN — SUCCINYLCHOLINE CHLORIDE 100 MG: 20 INJECTION, SOLUTION INTRAMUSCULAR; INTRAVENOUS at 10:12

## 2020-12-16 RX ADMIN — FAMOTIDINE 20 MG: 10 INJECTION INTRAVENOUS at 09:12

## 2020-12-16 RX ADMIN — ONDANSETRON 4 MG: 2 INJECTION INTRAMUSCULAR; INTRAVENOUS at 10:12

## 2020-12-16 RX ADMIN — ONDANSETRON HYDROCHLORIDE 4 MG: 2 SOLUTION INTRAMUSCULAR; INTRAVENOUS at 11:12

## 2020-12-16 RX ADMIN — DOXYCYCLINE HYCLATE 100 MG: 100 TABLET, COATED ORAL at 09:12

## 2020-12-16 RX ADMIN — SODIUM CHLORIDE, POTASSIUM CHLORIDE, SODIUM LACTATE AND CALCIUM CHLORIDE: 600; 310; 30; 20 INJECTION, SOLUTION INTRAVENOUS at 10:12

## 2020-12-16 RX ADMIN — MIDAZOLAM HYDROCHLORIDE 2 MG: 1 INJECTION, SOLUTION INTRAMUSCULAR; INTRAVENOUS at 10:12

## 2020-12-16 RX ADMIN — MORPHINE SULFATE 2 MG: 10 INJECTION INTRAVENOUS at 10:12

## 2020-12-16 NOTE — TRANSFER OF CARE
"Anesthesia Transfer of Care Note    Patient: Tess Pearson    Procedure(s) Performed: Procedure(s) (LRB):  HYSTEROSCOPY, WITH DILATION AND CURETTAGE OF UTERUS (N/A)  POLYPECTOMY, UTERUS, HYSTEROSCOPIC (N/A)    Patient location: PACU    Anesthesia Type: general    Transport from OR: Transported from OR on room air with adequate spontaneous ventilation    Post pain: adequate analgesia    Post assessment: no apparent anesthetic complications    Post vital signs: stable    Level of consciousness: awake, alert and oriented    Nausea/Vomiting: no nausea/vomiting    Complications: none    Transfer of care protocol was followed      Last vitals:   Visit Vitals  /81   Pulse 64   Temp 36.4 °C (97.6 °F) (Oral)   Resp 13   Ht 5' 6" (1.676 m)   Wt 90.7 kg (200 lb)   SpO2 100%   Breastfeeding No   BMI 32.28 kg/m²     "

## 2020-12-16 NOTE — ANESTHESIA PREPROCEDURE EVALUATION
12/16/2020  Tess Pearson is a 60 y.o., female.    Anesthesia Evaluation    I have reviewed the Patient Summary Reports.    I have reviewed the Nursing Notes. I have reviewed the NPO Status.   I have reviewed the Medications.     Review of Systems  Social:  Non-Smoker    Hematology/Oncology:  Hematology Normal   Oncology Normal     EENT/Dental:EENT/Dental Normal   Cardiovascular:  Cardiovascular Normal     Pulmonary:  Pulmonary Normal    Neurological:   Seizures    Endocrine:   Hypothyroidism        Physical Exam  General:  Well nourished    Airway/Jaw/Neck:  Airway Findings: Mouth Opening: Normal Tongue: Normal  General Airway Assessment: Adult  Mallampati: II  TM Distance: Normal, at least 6 cm       Chest/Lungs:  Chest/Lungs Findings: Clear to auscultation     Heart/Vascular:  Heart Findings: Rate: Normal  Rhythm: Regular Rhythm        Mental Status:  Mental Status Findings:  Cooperative, Alert and Oriented         Anesthesia Plan  Type of Anesthesia, risks & benefits discussed:  Anesthesia Type:  general  Patient's Preference:   Intra-op Monitoring Plan: standard ASA monitors  Intra-op Monitoring Plan Comments:   Post Op Pain Control Plan: IV/PO Opioids PRN  Post Op Pain Control Plan Comments:   Induction:   IV  Beta Blocker:  Patient is not currently on a Beta-Blocker (No further documentation required).       Informed Consent: Patient understands risks and agrees with Anesthesia plan.  Questions answered. Anesthesia consent signed with patient.  ASA Score: 3     Day of Surgery Review of History & Physical: I have interviewed and examined the patient. I have reviewed the patient's H&P dated:            Ready For Surgery From Anesthesia Perspective.

## 2020-12-16 NOTE — BRIEF OP NOTE
[facility] - Periop Services  Brief Operative Note    Surgery Date: 12/16/2020     Surgeon(s) and Role:     * Andrew Mckeon MD - Primary    Assisting Surgeon: None    Pre-op Diagnosis:  Fibroids, intramural [D25.1]  Endometrial polyp [N84.0]  Thickened endometrium [R93.89]    Post-op Diagnosis:  Post-Op Diagnosis Codes:     * Fibroids, intramural [D25.1]     * Endometrial polyp [N84.0]     * Thickened endometrium [R93.89]    Procedure(s) (LRB):  HYSTEROSCOPY, WITH DILATION AND CURETTAGE OF UTERUS (N/A)  POLYPECTOMY, UTERUS, HYSTEROSCOPIC (N/A)    Anesthesia: Choice    Description of the findings of the procedure(s): The patient sounded to 10 cm.  The patient had significant uterus prolapse to the introitus with an accompanying cystocele.  There was a thickened portion of endometrium consistent with a flat polyp on the anterior surface of the uterus and near the left cornua.  There was also a thickened area on the posterior aspect of the uterus    Estimated Blood Loss: * 10cc *         Specimens:  ECC, EMC        Discharge Note    OUTCOME: Patient tolerated treatment/procedure well without complication and is now ready for discharge.    DISPOSITION: Home or Self Care    FINAL DIAGNOSIS:  Endometrial polyp    FOLLOWUP: In clinic    DISCHARGE INSTRUCTIONS:    Discharge Procedure Orders   Diet general     Call MD for:  temperature >100.4     Call MD for:  persistent nausea and vomiting     Call MD for:  severe uncontrolled pain     Call MD for:  difficulty breathing, headache or visual disturbances     Call MD for:  redness, tenderness, or signs of infection (pain, swelling, redness, odor or green/yellow discharge around incision site)

## 2020-12-16 NOTE — DISCHARGE INSTRUCTIONS
[facility]   [phone]  [facility]      [phone]    Managing nausea    Some people have an upset stomach after surgery. This is often because of anesthesia, pain, or pain medicine, or the stress of surgery. These tips will help you handle nausea and eat healthy foods as you get better. If you were on a special food plan before surgery, ask your healthcare provider if you should follow it while you get better. These tips may help:  · Do not push yourself to eat. Your body will tell you when to eat and how much.  · Start off with clear liquids and soup. They are easier to digest.  · Next try semi-solid foods, such as mashed potatoes, applesauce, and gelatin, as you feel ready.  · Slowly move to solid foods. Dont eat fatty, rich, or spicy foods at first.  · Do not force yourself to have 3 large meals a day. Instead eat smaller amounts more often.  · Take pain medicines with a small amount of solid food, such as crackers or toast, to avoid nausea.   Discharge Instructions for Dilation and Curettage, (D and C), Hysteroscopy and Endometrial Ablation   Home care:  · Take it easy. Rest for 2 days as needed.  · Return to your normal activities after 24 to 48 hours.   · Eat a normal diet.  · Take an over-the-counter pain reliever for pain, if needed.  · Remember, its OK to have bleeding for about a week after the procedure. The amount of bleeding should be similar to what you have during a normal period.  · Dont drive unless specifically told by your provider that it is OK to do so.  · Dont have sexual intercourse or use tampons or douches until your doctor says its safe to do so.  Follow-up  · Make a follow-up appointment as directed by our staff.     When to call your doctor  Call your doctor right away if you have any of the following:  · Bleeding that soaks more than one sanitary pad in one hour  · Severe abdominal pain  · Severe cramps  · Fever above 100.4°F  (38.0°C)  · A foul smelling vaginal discharge   Date Last Reviewed: 5/19/2015  © 7153-8666 [employer]. [address] [place] [address]. All rights reserved. This information is not intended as a substitute for professional medical care. Always follow your healthcare professional's instructions.    Discharge Instructions for Dilation and Curettage, (D and C), Hysteroscopy and Polypectomy  Home care:  · Take it easy. Rest for 2 days as needed.  · Return to your normal activities after 24 to 48 hours.   · Eat a normal diet.  · Take an over-the-counter pain reliever for pain, if needed.  · Remember, its OK to have bleeding for about a week after the procedure. The amount of bleeding should be similar to what you have during a normal period.  · Dont drive unless specifically told by your provider that it is OK to do so.  · Dont have sexual intercourse or use tampons or douches until your doctor says its safe to do so.  Follow-up  · Make a follow-up appointment as directed by our staff.     When to call your doctor  Call your doctor right away if you have any of the following:  · Bleeding that soaks more than one sanitary pad in one hour  · Severe abdominal pain  · Severe cramps  · Fever above 100.4°F (38.0°C)  · A foul smelling vaginal discharge   Date Last Reviewed: 5/19/2015  © 4932-4462 [employer]. [address] [place] [address]. All rights reserved. This information is not intended as a substitute for professional medical care. Always follow your healthcare professional's instructions.

## 2020-12-16 NOTE — OP NOTE
[facility] - Periop Services  Operative Note     SUMMARY     Surgery Date: 12/16/2020     Procedure Performed By: Andrew Mckeon MD    Procedure Performed: D & C Hysteroscopy with Myosure     Anesthesia:  Choice    Assisted By: ravinder    Pre-op Diagnosis:  Fibroids, intramural [D25.1]  Endometrial polyp [N84.0]  Thickened endometrium [R93.89]    Post-op Diagnosis:  Post-Op Diagnosis Codes:     * Fibroids, intramural [D25.1]     * Endometrial polyp [N84.0]     * Thickened endometrium [R93.89]     Estimated Blood Loss: * 10cc *  Complications: none  Specimen:  Specimens (From admission, onward)     Start     Ordered    12/16/20 1100  Specimen to Pathology, Surgery Gynecology and Obstetrics  Once     Question:  Procedure Type:  Answer:  Gynecology and Obstetrics    12/16/20 1100              Findings:  The patient sounded to 10 cm.  The patient had significant uterus prolapse to the introitus with an accompanying cystocele.  There was a thickened portion of endometrium consistent with a flat polyp on the anterior surface of the uterus and near the left cornua.  There was also a thickened area on the posterior aspect of the uterus    Procedure Performed: D & C Hysteroscopy with Myosure     Procedure in Detail: After ensuring informed consent, the patient was taken to the operating room where general anesthesia was initiated. A time out was performed with the O.R. crew. She was placed in the adjustable Jose Roberto stirrups. Her perineum was prepped and draped in the usual sterile fashion. The anterior lip of the cervix was grasped with a single- toothed tenaculum. The uterus was sounded to the above stated length. The patient was gently dilated to the highest dilatation with the Hanks dilators. The hysteroscope was then placed inside the patients uterus, and inspection of the patients uterus revealed the above findings. The Myosure device was placed inside the uterine cavity. The endometrial pathology was  removed. The hysteroscope was removed. All instruments were removed from the patients vagina. She was taken out of the adjustable Jose Roberto stirrups and placed in the supine position. She was awakened from anesthesia and taken to the recovery room in stable condition.  All counts were correct x 2. The patient tolerated the procedure well. The tissue was sent to the pathology.

## 2020-12-16 NOTE — ANESTHESIA POSTPROCEDURE EVALUATION
Anesthesia Post Evaluation    Patient: Tess Pearson    Procedure(s) Performed: Procedure(s) (LRB):  HYSTEROSCOPY, WITH DILATION AND CURETTAGE OF UTERUS (N/A)  POLYPECTOMY, UTERUS, HYSTEROSCOPIC (N/A)    Final Anesthesia Type: general    Patient location during evaluation: PACU  Patient participation: Yes- Able to Participate  Level of consciousness: awake and alert  Post-procedure vital signs: reviewed and stable  Pain management: adequate  Airway patency: patent    PONV status at discharge: No PONV  Anesthetic complications: no      Cardiovascular status: blood pressure returned to baseline  Respiratory status: unassisted  Hydration status: euvolemic  Follow-up not needed.          Vitals Value Taken Time   /83 12/16/20 1202   Temp 36.4 °C (97.6 °F) 12/16/20 1105   Pulse 68 12/16/20 1211   Resp 15 12/16/20 1211   SpO2 96 % 12/16/20 1211   Vitals shown include unvalidated device data.      Event Time   Out of Recovery 12:00:00         Pain/Gerard Score: Gerard Score: 10 (12/16/2020 12:00 PM)

## 2020-12-18 VITALS
TEMPERATURE: 98 F | HEART RATE: 62 BPM | OXYGEN SATURATION: 98 % | HEIGHT: 66 IN | BODY MASS INDEX: 32.14 KG/M2 | RESPIRATION RATE: 10 BRPM | SYSTOLIC BLOOD PRESSURE: 120 MMHG | DIASTOLIC BLOOD PRESSURE: 83 MMHG | WEIGHT: 200 LBS

## 2020-12-23 LAB
FINAL PATHOLOGIC DIAGNOSIS: NORMAL
GROSS: NORMAL
Lab: NORMAL

## 2021-07-01 ENCOUNTER — OFFICE VISIT (OUTPATIENT)
Dept: RHEUMATOLOGY | Facility: CLINIC | Age: 61
End: 2021-07-01
Payer: COMMERCIAL

## 2021-07-01 VITALS — WEIGHT: 193 LBS | SYSTOLIC BLOOD PRESSURE: 104 MMHG | DIASTOLIC BLOOD PRESSURE: 70 MMHG | BODY MASS INDEX: 31.15 KG/M2

## 2021-07-01 DIAGNOSIS — M19.042 PRIMARY OSTEOARTHRITIS OF BOTH HANDS: ICD-10-CM

## 2021-07-01 DIAGNOSIS — M22.40 CHONDROMALACIA OF PATELLA, UNSPECIFIED LATERALITY: ICD-10-CM

## 2021-07-01 DIAGNOSIS — M17.0 PRIMARY OSTEOARTHRITIS OF BOTH KNEES: Primary | ICD-10-CM

## 2021-07-01 DIAGNOSIS — M19.041 PRIMARY OSTEOARTHRITIS OF BOTH HANDS: ICD-10-CM

## 2021-07-01 PROCEDURE — 3008F BODY MASS INDEX DOCD: CPT | Mod: S$GLB,,, | Performed by: INTERNAL MEDICINE

## 2021-07-01 PROCEDURE — 99203 OFFICE O/P NEW LOW 30 MIN: CPT | Mod: S$GLB,,, | Performed by: INTERNAL MEDICINE

## 2021-07-01 PROCEDURE — 3008F PR BODY MASS INDEX (BMI) DOCUMENTED: ICD-10-PCS | Mod: S$GLB,,, | Performed by: INTERNAL MEDICINE

## 2021-07-01 PROCEDURE — 1125F PR PAIN SEVERITY QUANTIFIED, PAIN PRESENT: ICD-10-PCS | Mod: S$GLB,,, | Performed by: INTERNAL MEDICINE

## 2021-07-01 PROCEDURE — 1125F AMNT PAIN NOTED PAIN PRSNT: CPT | Mod: S$GLB,,, | Performed by: INTERNAL MEDICINE

## 2021-07-01 PROCEDURE — 99203 PR OFFICE/OUTPT VISIT, NEW, LEVL III, 30-44 MIN: ICD-10-PCS | Mod: S$GLB,,, | Performed by: INTERNAL MEDICINE

## 2021-07-01 RX ORDER — OMEPRAZOLE 20 MG/1
20 CAPSULE, DELAYED RELEASE ORAL DAILY
COMMUNITY
Start: 2021-05-02 | End: 2022-12-05

## 2021-07-01 RX ORDER — LANOLIN ALCOHOL/MO/W.PET/CERES
100 CREAM (GRAM) TOPICAL DAILY
COMMUNITY

## 2021-07-06 LAB
ALBUMIN SERPL-MCNC: 4.4 G/DL (ref 3.6–5.1)
ALBUMIN/GLOB SERPL: 1.7 (CALC) (ref 1–2.5)
ALP SERPL-CCNC: 160 U/L (ref 37–153)
ALT SERPL-CCNC: 14 U/L (ref 6–29)
AST SERPL-CCNC: 17 U/L (ref 10–35)
BASOPHILS # BLD AUTO: 82 CELLS/UL (ref 0–200)
BASOPHILS NFR BLD AUTO: 1 %
BILIRUB SERPL-MCNC: 0.2 MG/DL (ref 0.2–1.2)
BUN SERPL-MCNC: 15 MG/DL (ref 7–25)
BUN/CREAT SERPL: ABNORMAL (CALC) (ref 6–22)
CALCIUM SERPL-MCNC: 9.5 MG/DL (ref 8.6–10.4)
CCP IGG SERPL-ACNC: <16 UNITS
CHLORIDE SERPL-SCNC: 106 MMOL/L (ref 98–110)
CO2 SERPL-SCNC: 24 MMOL/L (ref 20–32)
CREAT SERPL-MCNC: 0.72 MG/DL (ref 0.5–1.05)
CRP SERPL-MCNC: 9.9 MG/L
EOSINOPHIL # BLD AUTO: 500 CELLS/UL (ref 15–500)
EOSINOPHIL NFR BLD AUTO: 6.1 %
ERYTHROCYTE [DISTWIDTH] IN BLOOD BY AUTOMATED COUNT: 14 % (ref 11–15)
GLOBULIN SER CALC-MCNC: 2.6 G/DL (CALC) (ref 1.9–3.7)
GLUCOSE SERPL-MCNC: 58 MG/DL (ref 65–139)
HCT VFR BLD AUTO: 39.3 % (ref 35–45)
HGB BLD-MCNC: 12.9 G/DL (ref 11.7–15.5)
LYMPHOCYTES # BLD AUTO: 2296 CELLS/UL (ref 850–3900)
LYMPHOCYTES NFR BLD AUTO: 28 %
MCH RBC QN AUTO: 28.1 PG (ref 27–33)
MCHC RBC AUTO-ENTMCNC: 32.8 G/DL (ref 32–36)
MCV RBC AUTO: 85.6 FL (ref 80–100)
MONOCYTES # BLD AUTO: 861 CELLS/UL (ref 200–950)
MONOCYTES NFR BLD AUTO: 10.5 %
NEUTROPHILS # BLD AUTO: 4461 CELLS/UL (ref 1500–7800)
NEUTROPHILS NFR BLD AUTO: 54.4 %
PLATELET # BLD AUTO: 315 THOUSAND/UL (ref 140–400)
PMV BLD REES-ECKER: 11.2 FL (ref 7.5–12.5)
POTASSIUM SERPL-SCNC: 4.3 MMOL/L (ref 3.5–5.3)
PROT SERPL-MCNC: 7 G/DL (ref 6.1–8.1)
RBC # BLD AUTO: 4.59 MILLION/UL (ref 3.8–5.1)
RHEUMATOID FACT SERPL-ACNC: <14 IU/ML
SODIUM SERPL-SCNC: 143 MMOL/L (ref 135–146)
WBC # BLD AUTO: 8.2 THOUSAND/UL (ref 3.8–10.8)

## 2021-07-29 ENCOUNTER — TELEPHONE (OUTPATIENT)
Dept: RHEUMATOLOGY | Facility: CLINIC | Age: 61
End: 2021-07-29

## 2021-12-20 ENCOUNTER — OFFICE VISIT (OUTPATIENT)
Dept: SURGERY | Facility: CLINIC | Age: 61
End: 2021-12-20
Payer: COMMERCIAL

## 2021-12-20 VITALS
OXYGEN SATURATION: 99 % | DIASTOLIC BLOOD PRESSURE: 79 MMHG | WEIGHT: 207 LBS | TEMPERATURE: 98 F | HEIGHT: 66 IN | SYSTOLIC BLOOD PRESSURE: 116 MMHG | HEART RATE: 68 BPM | BODY MASS INDEX: 33.27 KG/M2

## 2021-12-20 DIAGNOSIS — Z12.11 SCREENING FOR COLON CANCER: Primary | ICD-10-CM

## 2021-12-20 DIAGNOSIS — Z86.010 HISTORY OF COLON POLYPS: ICD-10-CM

## 2021-12-20 DIAGNOSIS — Z01.818 PRE-OP TESTING: ICD-10-CM

## 2021-12-20 PROCEDURE — 99203 PR OFFICE/OUTPT VISIT, NEW, LEVL III, 30-44 MIN: ICD-10-PCS | Mod: S$GLB,,, | Performed by: STUDENT IN AN ORGANIZED HEALTH CARE EDUCATION/TRAINING PROGRAM

## 2021-12-20 PROCEDURE — 99999 PR PBB SHADOW E&M-EST. PATIENT-LVL V: CPT | Mod: PBBFAC,,, | Performed by: STUDENT IN AN ORGANIZED HEALTH CARE EDUCATION/TRAINING PROGRAM

## 2021-12-20 PROCEDURE — 99203 OFFICE O/P NEW LOW 30 MIN: CPT | Mod: S$GLB,,, | Performed by: STUDENT IN AN ORGANIZED HEALTH CARE EDUCATION/TRAINING PROGRAM

## 2021-12-20 PROCEDURE — 99999 PR PBB SHADOW E&M-EST. PATIENT-LVL V: ICD-10-PCS | Mod: PBBFAC,,, | Performed by: STUDENT IN AN ORGANIZED HEALTH CARE EDUCATION/TRAINING PROGRAM

## 2021-12-20 RX ORDER — SOD SULF/POT CHLORIDE/MAG SULF 1.479 G
24 TABLET ORAL DAILY
Qty: 24 TABLET | Refills: 0 | Status: ON HOLD | OUTPATIENT
Start: 2021-12-20 | End: 2022-04-21 | Stop reason: HOSPADM

## 2021-12-20 RX ORDER — SODIUM CHLORIDE 9 MG/ML
INJECTION, SOLUTION INTRAVENOUS CONTINUOUS
Status: CANCELLED | OUTPATIENT
Start: 2021-12-20

## 2021-12-20 RX ORDER — FLUTICASONE PROPIONATE 50 MCG
SPRAY, SUSPENSION (ML) NASAL
COMMUNITY
Start: 2021-10-01

## 2021-12-20 RX ORDER — METOPROLOL SUCCINATE 25 MG/1
12.5 TABLET, EXTENDED RELEASE ORAL DAILY
COMMUNITY
Start: 2021-12-06

## 2021-12-20 RX ORDER — FAMOTIDINE 20 MG/1
20 TABLET, FILM COATED ORAL
COMMUNITY
Start: 2021-10-06

## 2022-04-18 ENCOUNTER — HOSPITAL ENCOUNTER (OUTPATIENT)
Dept: CARDIOLOGY | Facility: HOSPITAL | Age: 62
Discharge: HOME | End: 2022-04-18
Attending: STUDENT IN AN ORGANIZED HEALTH CARE EDUCATION/TRAINING PROGRAM
Payer: COMMERCIAL

## 2022-04-18 DIAGNOSIS — Z01.818 PRE-OP TESTING: ICD-10-CM

## 2022-04-18 PROCEDURE — 93005 ELECTROCARDIOGRAM TRACING: CPT

## 2022-04-18 PROCEDURE — 93010 ELECTROCARDIOGRAM REPORT: CPT | Mod: ,,, | Performed by: INTERNAL MEDICINE

## 2022-04-18 PROCEDURE — 93010 EKG 12-LEAD: ICD-10-PCS | Mod: ,,, | Performed by: INTERNAL MEDICINE

## 2022-04-21 ENCOUNTER — ANESTHESIA EVENT (OUTPATIENT)
Dept: SURGERY | Facility: HOSPITAL | Age: 62
End: 2022-04-21
Payer: COMMERCIAL

## 2022-04-21 ENCOUNTER — ANESTHESIA (OUTPATIENT)
Dept: SURGERY | Facility: HOSPITAL | Age: 62
End: 2022-04-21
Payer: COMMERCIAL

## 2022-04-21 ENCOUNTER — HOSPITAL ENCOUNTER (OUTPATIENT)
Facility: HOSPITAL | Age: 62
Discharge: HOME | End: 2022-04-21
Attending: STUDENT IN AN ORGANIZED HEALTH CARE EDUCATION/TRAINING PROGRAM | Admitting: STUDENT IN AN ORGANIZED HEALTH CARE EDUCATION/TRAINING PROGRAM
Payer: COMMERCIAL

## 2022-04-21 VITALS
OXYGEN SATURATION: 95 % | HEIGHT: 66 IN | BODY MASS INDEX: 31.34 KG/M2 | TEMPERATURE: 98 F | SYSTOLIC BLOOD PRESSURE: 113 MMHG | RESPIRATION RATE: 9 BRPM | WEIGHT: 195 LBS | HEART RATE: 73 BPM | DIASTOLIC BLOOD PRESSURE: 70 MMHG

## 2022-04-21 DIAGNOSIS — Z12.11 SCREENING FOR COLON CANCER: ICD-10-CM

## 2022-04-21 PROCEDURE — 25000003 PHARM REV CODE 250: Performed by: NURSE ANESTHETIST, CERTIFIED REGISTERED

## 2022-04-21 PROCEDURE — 88305 TISSUE EXAM BY PATHOLOGIST: CPT | Mod: 26,,, | Performed by: PATHOLOGY

## 2022-04-21 PROCEDURE — D9220A PRA ANESTHESIA: ICD-10-PCS | Mod: 33,CRNA,, | Performed by: NURSE ANESTHETIST, CERTIFIED REGISTERED

## 2022-04-21 PROCEDURE — 88305 TISSUE EXAM BY PATHOLOGIST: CPT | Mod: 59 | Performed by: PATHOLOGY

## 2022-04-21 PROCEDURE — 88305 TISSUE EXAM BY PATHOLOGIST: ICD-10-PCS | Mod: 26,,, | Performed by: PATHOLOGY

## 2022-04-21 PROCEDURE — 45385 PR COLONOSCOPY,REMV LESN,SNARE: ICD-10-PCS | Mod: 33,,, | Performed by: STUDENT IN AN ORGANIZED HEALTH CARE EDUCATION/TRAINING PROGRAM

## 2022-04-21 PROCEDURE — 45385 COLONOSCOPY W/LESION REMOVAL: CPT | Mod: 33,,, | Performed by: STUDENT IN AN ORGANIZED HEALTH CARE EDUCATION/TRAINING PROGRAM

## 2022-04-21 PROCEDURE — D9220A PRA ANESTHESIA: Mod: 33,ANES,, | Performed by: ANESTHESIOLOGY

## 2022-04-21 PROCEDURE — 45385 COLONOSCOPY W/LESION REMOVAL: CPT | Mod: PT | Performed by: STUDENT IN AN ORGANIZED HEALTH CARE EDUCATION/TRAINING PROGRAM

## 2022-04-21 PROCEDURE — 25000003 PHARM REV CODE 250: Performed by: ANESTHESIOLOGY

## 2022-04-21 PROCEDURE — D9220A PRA ANESTHESIA: ICD-10-PCS | Mod: 33,ANES,, | Performed by: ANESTHESIOLOGY

## 2022-04-21 PROCEDURE — 37000008 HC ANESTHESIA 1ST 15 MINUTES: Performed by: STUDENT IN AN ORGANIZED HEALTH CARE EDUCATION/TRAINING PROGRAM

## 2022-04-21 PROCEDURE — D9220A PRA ANESTHESIA: Mod: 33,CRNA,, | Performed by: NURSE ANESTHETIST, CERTIFIED REGISTERED

## 2022-04-21 PROCEDURE — 27201423 OPTIME MED/SURG SUP & DEVICES STERILE SUPPLY: Performed by: STUDENT IN AN ORGANIZED HEALTH CARE EDUCATION/TRAINING PROGRAM

## 2022-04-21 PROCEDURE — 63600175 PHARM REV CODE 636 W HCPCS: Performed by: NURSE ANESTHETIST, CERTIFIED REGISTERED

## 2022-04-21 PROCEDURE — 37000009 HC ANESTHESIA EA ADD 15 MINS: Performed by: STUDENT IN AN ORGANIZED HEALTH CARE EDUCATION/TRAINING PROGRAM

## 2022-04-21 PROCEDURE — 63600175 PHARM REV CODE 636 W HCPCS: Performed by: ANESTHESIOLOGY

## 2022-04-21 RX ORDER — FAMOTIDINE 10 MG/ML
INJECTION INTRAVENOUS
Status: DISCONTINUED
Start: 2022-04-21 | End: 2022-04-21 | Stop reason: HOSPADM

## 2022-04-21 RX ORDER — SODIUM CHLORIDE 9 MG/ML
INJECTION, SOLUTION INTRAVENOUS CONTINUOUS
Status: DISCONTINUED | OUTPATIENT
Start: 2022-04-21 | End: 2022-04-21 | Stop reason: HOSPADM

## 2022-04-21 RX ORDER — ONDANSETRON 2 MG/ML
4 INJECTION INTRAMUSCULAR; INTRAVENOUS DAILY PRN
Status: DISCONTINUED | OUTPATIENT
Start: 2022-04-21 | End: 2022-04-21 | Stop reason: HOSPADM

## 2022-04-21 RX ORDER — SODIUM CHLORIDE, SODIUM LACTATE, POTASSIUM CHLORIDE, CALCIUM CHLORIDE 600; 310; 30; 20 MG/100ML; MG/100ML; MG/100ML; MG/100ML
INJECTION, SOLUTION INTRAVENOUS CONTINUOUS
Status: DISCONTINUED | OUTPATIENT
Start: 2022-04-21 | End: 2022-04-21 | Stop reason: HOSPADM

## 2022-04-21 RX ORDER — DIPHENHYDRAMINE HYDROCHLORIDE 50 MG/ML
12.5 INJECTION INTRAMUSCULAR; INTRAVENOUS
Status: DISCONTINUED | OUTPATIENT
Start: 2022-04-21 | End: 2022-04-21 | Stop reason: HOSPADM

## 2022-04-21 RX ORDER — PROPOFOL 10 MG/ML
VIAL (ML) INTRAVENOUS
Status: DISCONTINUED | OUTPATIENT
Start: 2022-04-21 | End: 2022-04-21

## 2022-04-21 RX ORDER — SODIUM CHLORIDE, SODIUM LACTATE, POTASSIUM CHLORIDE, CALCIUM CHLORIDE 600; 310; 30; 20 MG/100ML; MG/100ML; MG/100ML; MG/100ML
125 INJECTION, SOLUTION INTRAVENOUS CONTINUOUS
Status: DISCONTINUED | OUTPATIENT
Start: 2022-04-21 | End: 2022-04-21 | Stop reason: HOSPADM

## 2022-04-21 RX ORDER — FAMOTIDINE 10 MG/ML
20 INJECTION INTRAVENOUS ONCE
Status: COMPLETED | OUTPATIENT
Start: 2022-04-21 | End: 2022-04-21

## 2022-04-21 RX ORDER — LIDOCAINE HYDROCHLORIDE 10 MG/ML
1 INJECTION, SOLUTION EPIDURAL; INFILTRATION; INTRACAUDAL; PERINEURAL ONCE
Status: DISCONTINUED | OUTPATIENT
Start: 2022-04-21 | End: 2022-04-21 | Stop reason: HOSPADM

## 2022-04-21 RX ORDER — LIDOCAINE HYDROCHLORIDE 20 MG/ML
INJECTION, SOLUTION EPIDURAL; INFILTRATION; INTRACAUDAL; PERINEURAL
Status: DISCONTINUED | OUTPATIENT
Start: 2022-04-21 | End: 2022-04-21

## 2022-04-21 RX ADMIN — FAMOTIDINE 20 MG: 10 INJECTION INTRAVENOUS at 07:04

## 2022-04-21 RX ADMIN — SODIUM CHLORIDE, SODIUM LACTATE, POTASSIUM CHLORIDE, AND CALCIUM CHLORIDE: .6; .31; .03; .02 INJECTION, SOLUTION INTRAVENOUS at 07:04

## 2022-04-21 RX ADMIN — PROPOFOL 50 MG: 10 INJECTION, EMULSION INTRAVENOUS at 07:04

## 2022-04-21 RX ADMIN — GLYCOPYRROLATE 0.4 MG: 0.4 INJECTION INTRAMUSCULAR; INTRAVENOUS at 07:04

## 2022-04-21 RX ADMIN — PROPOFOL 100 MG: 10 INJECTION, EMULSION INTRAVENOUS at 07:04

## 2022-04-21 RX ADMIN — LIDOCAINE HYDROCHLORIDE 100 MG: 20 INJECTION, SOLUTION EPIDURAL; INFILTRATION; INTRACAUDAL; PERINEURAL at 07:04

## 2022-04-21 NOTE — [employer] PATIENT INSTRUCTIONS
Discharge Summary/Instructions after an Endoscopic Procedure  Patient Name: Tess Pearson  Patient MRN: [id number]  Patient YOB: 1960 Thursday, April 21, 2022  Diane Higuera MD  RESTRICTIONS:  During your procedure today, you received medications for sedation.  These   medications may affect your judgment, balance and coordination.  Therefore,   for 24 hours, you have the following restrictions:   - DO NOT drive a car, operate machinery, make legal/financial decisions,   sign important papers or drink alcohol.    ACTIVITY:  Today: no heavy lifting, straining or running due to procedural   sedation/anesthesia.  The following day: return to full activity including work.  DIET:  Eat and drink normally unless instructed otherwise.     TREATMENT FOR COMMON SIDE EFFECTS:  - Mild abdominal pain, nausea, belching, bloating or excessive gas:  rest,   eat lightly and use a heating pad.  - Sore Throat: treat with throat lozenges and/or gargle with warm salt   water.  - Because air was used during the procedure, expelling large amounts of air   from your rectum or belching is normal.  - If a bowel prep was taken, you may not have a bowel movement for 1-3 days.    This is normal.  SYMPTOMS TO WATCH FOR AND REPORT TO YOUR PHYSICIAN:  1. Abdominal pain or bloating, other than gas cramps.  2. Chest pain.  3. Back pain.  4. Signs of infection such as: chills or fever occurring within 24 hours   after the procedure.  5. Rectal bleeding, which would show as bright red, maroon, or black stools.   (A tablespoon of blood from the rectum is not serious, especially if   hemorrhoids are present.)  6. Vomiting.  7. Weakness or dizziness.  GO DIRECTLY TO THE NEAREST EMERGENCY ROOM IF YOU HAVE ANY OF THE FOLLOWING:      Difficulty breathing              Chills and/or fever over 101 F   Persistent vomiting and/or vomiting blood   Severe abdominal pain   Severe chest pain   Black, tarry stools   Bleeding- more than one  tablespoon   Any other symptom or condition that you feel may need urgent attention  Your doctor recommends these additional instructions:  If any biopsies were taken, your doctors clinic will contact you in 1 to 2   weeks with any results.  - Discharge patient to home.   - Resume previous diet.   - Continue present medications.   - Await pathology results.   - Repeat colonoscopy in 10 years for screening purposes.   - Return to my office in 2 weeks.  For questions, problems or results please call your physician - Diane Higuera MD at Work:  [phone].  [facility] EMERGENCY ROOM PHONE NUMBER: [phone]  IF A COMPLICATION OR EMERGENCY SITUATION ARISES AND YOU ARE UNABLE TO REACH   YOUR PHYSICIAN - GO DIRECTLY TO THE EMERGENCY ROOM.  MD Diane Conway MD  4/21/2022 8:03:29 AM  This report has been verified and signed electronically.  Dear patient,  As a result of recent federal legislation (The Federal Cures Act), you may   receive lab or pathology results from your procedure in your [facility]   account before your physician is able to contact you. Your physician or   their representative will relay the results to you with their   recommendations at their soonest availability.  Thank you,  [employer]

## 2022-04-21 NOTE — ANESTHESIA PREPROCEDURE EVALUATION
04/21/2022  Tess Pearson is a 61 y.o., female.      Pre-op Assessment    I have reviewed the Patient Summary Reports.     I have reviewed the Nursing Notes. I have reviewed the NPO Status.   I have reviewed the Medications.     Review of Systems  Social:  Non-Smoker    Hematology/Oncology:  Hematology Normal   Oncology Normal     EENT/Dental:EENT/Dental Normal   Cardiovascular:  Cardiovascular Normal     Pulmonary:  Pulmonary Normal    Renal/:  Renal/ Normal     Hepatic/GI:  Hepatic/GI Normal    Musculoskeletal:  Musculoskeletal Normal    Neurological:   Seizures    Endocrine:  Endocrine Normal    Dermatological:  Skin Normal    Psych:  Psychiatric Normal           Physical Exam    Airway:  Mallampati: II   Mouth Opening: Normal  TM Distance: Normal  Tongue: Normal  Neck ROM: Normal ROM    Chest/Lungs:  Clear to auscultation    Heart:  Rate: Normal  Rhythm: Regular Rhythm        Anesthesia Plan  Type of Anesthesia, risks & benefits discussed:    Anesthesia Type: Gen Natural Airway  Intra-op Monitoring Plan: Standard ASA Monitors  Post Op Pain Control Plan: multimodal analgesia  Induction:  IV  Informed Consent: Informed consent signed with the Patient and all parties understand the risks and agree with anesthesia plan.  All questions answered.   ASA Score: 3  Day of Surgery Review of History & Physical: H&P Update referred to the surgeon/provider.    Ready For Surgery From Anesthesia Perspective.     .

## 2022-04-21 NOTE — PLAN OF CARE
PIV removed. Discharge instructions reviewed with patient. Encouraged to attend follow up appt. Verbalized understanding.

## 2022-04-21 NOTE — ANESTHESIA POSTPROCEDURE EVALUATION
Anesthesia Post Evaluation    Patient: Tess Pearson    Procedure(s) Performed: Procedure(s) (LRB):  COLONOSCOPY (N/A)    Final Anesthesia Type: general      Patient location during evaluation: PACU  Patient participation: Yes- Able to Participate  Level of consciousness: awake and alert  Post-procedure vital signs: reviewed and stable  Pain management: adequate  Airway patency: patent    PONV status at discharge: No PONV  Anesthetic complications: no      Cardiovascular status: blood pressure returned to baseline  Respiratory status: unassisted  Hydration status: euvolemic  Follow-up not needed.          Vitals Value Taken Time   /94 04/21/22 0831   Temp 35.6 04/21/22 1326   Pulse 69 04/21/22 0844   Resp 10 04/21/22 0844   SpO2 94 % 04/21/22 0844   Vitals shown include unvalidated device data.      Event Time   Out of Recovery 08:17:00         Pain/Gerard Score: Gerard Score: 10 (4/21/2022  8:30 AM)  Modified Gerard Score: 20 (4/21/2022  8:30 AM)

## 2022-04-21 NOTE — TRANSFER OF CARE
"Anesthesia Transfer of Care Note    Patient: Tess Pearson    Procedure(s) Performed: Procedure(s) (LRB):  COLONOSCOPY (N/A)    Patient location: PACU    Anesthesia Type: general    Transport from OR: Transported from OR on room air with adequate spontaneous ventilation    Post pain: adequate analgesia    Post assessment: no apparent anesthetic complications and tolerated procedure well    Post vital signs: stable    Level of consciousness: awake, alert and oriented    Nausea/Vomiting: no nausea/vomiting    Complications: none    Transfer of care protocol was followed      Last vitals:   Visit Vitals  /76   Pulse 66   Temp 36.7 °C (98.1 °F) (Oral)   Resp 15   Ht 5' 6" (1.676 m)   Wt 88.5 kg (195 lb)   SpO2 100%   Breastfeeding No   BMI 31.47 kg/m²     "

## 2022-04-21 NOTE — DISCHARGE SUMMARY
[facility] Surgery  Discharge Note  Short Stay    Procedure(s) (LRB):  COLONOSCOPY (N/A)    OUTCOME: Patient tolerated treatment/procedure well without complication and is now ready for discharge.    DISPOSITION: Home or Self Care    FINAL DIAGNOSIS:  Screening for colon cancer    FOLLOWUP: In clinic    DISCHARGE INSTRUCTIONS:    Discharge Procedure Orders   Diet general     Call MD for:  temperature >100.4     Call MD for:  persistent nausea and vomiting     Call MD for:  severe uncontrolled pain     Call MD for:  difficulty breathing, headache or visual disturbances     Call MD for:  redness, tenderness, or signs of infection (pain, swelling, redness, odor or green/yellow discharge around incision site)     Call MD for:  persistent dizziness or light-headedness

## 2022-04-21 NOTE — H&P
[facility] H&P Note    Subjective:       Patient ID: Tess Pearson is a 61 y.o. female.    Chief Complaint:  I need a screening colonoscopy.  HPI:  Tess Pearson is a 61 y.o. female presents today for evaluation of screening colonoscopy.    The patient has no hematochezia.    The patient has no family history of colon cancer.    The patient denies weight loss or bowel habit changes.  Last colonoscopy was reportedly over 5 years ago at this facility. I do not have access to these records however the patient states they found a few polyps and that she needed a repeat colonoscopy in 5 years.    Past Medical History:   Diagnosis Date    Epilepsy     Thyroid disease      Past Surgical History:   Procedure Laterality Date    COLONOSCOPY W/ BIOPSIES  2016    [facility]    ENDOMETRIAL ABLATION      gastric sleeve      HYSTEROSCOPIC POLYPECTOMY OF UTERUS N/A 12/16/2020    Procedure: POLYPECTOMY, UTERUS, HYSTEROSCOPIC;  Surgeon: Andrew Mckeon MD;  Location: [facility] OR;  Service: OB/GYN;  Laterality: N/A;    HYSTEROSCOPY WITH DILATION AND CURETTAGE OF UTERUS N/A 12/16/2020    Procedure: HYSTEROSCOPY, WITH DILATION AND CURETTAGE OF UTERUS;  Surgeon: Andrew Mckeon MD;  Location: [facility] OR;  Service: OB/GYN;  Laterality: N/A;     Family History   Problem Relation Age of Onset    Heart murmur Mother     Hyperlipidemia Mother     Hypertension Mother     Heart failure Father     Hyperlipidemia Father     Hypertension Father      Social History     Socioeconomic History    Marital status:    Tobacco Use    Smoking status: Never Smoker    Smokeless tobacco: Never Used   Substance and Sexual Activity    Alcohol use: No    Drug use: No    Sexual activity: Yes     Partners: Male   Social History Narrative    ADVANCED MD PLANS        GYN Exam (Annual/6Wk PP)10 [facility]1/25/2019     Annual    Hypothyroidism    Uterine Fibroids        Visit Summary    Pap done    Mammo @ [facility]    Colonscopy up to date     Labs done with Dr Vargas    Healthy diet and exercise discussed    TVUS today: reassuring    Flu shot        Return for follow up appointment in one year or prn.     GYN Exam (Annual/6Wk PP)10 [facility]11/19/2018     Annual exam.  Hypothyroidism.  Chronic/recurrent UTIs.  Uterine fibroids.    Mammogram at [facility].    PCP: Dr. Vargas.    Urine for culture and sensitivity.    Consult Dr. Cleaning secondary to recurrent UTIs.    Visit Summary    Ordered:    VAG U/S NON OB     GYN Visit & [id number] [facility]1/23/2018     Chronic urinary tract infection    Lower back pain        Visit Summary    Ordered:    Urine Culture, Routine    If culture positive, refer to urology.    Pt just finished antibiotics x one month.    Pt has had three positive urine cultures in the past 3-4 months.        Return for follow up appointment prn/annual.     GYN Visit & [id number] [facility]12/6/2017     Urinary Tract Infection    Ear pain    Allergic Rhinitis        Visit Summary    Pt has Benadryl Sinus at home to take    Ordered:    UA w/o Micro for culture        Return for follow up appointment prn/annual.     GYN Visit & [id number] [facility]11/13/2017     Urinary Tract Infection    Recently diagnosed with Hypothyroidism        Visit Summary    Repeat Urine Culture today    Pt was asymptomatic    Follow up with Dr Vargas as scheduled.    Follow up with Cardiologist as scheduled        Return for follow up appointment prn/annual.     GYN Exam (Annual/6Wk PP)10 [facility]10/16/2017     Annual exam.   right lower quadrant pain.  Uterine fibroids.    Mammogram at [facility].    Transvaginal ultrasound today.        Visit Summary    Ordered:    Urine Culture, Routine    Pap IG, rfx HPV ASCU     GYN Visit & [id number] [facility]1/10/2017     Pelvic Pain: resolved    R Flank pain resolved        Visit Summary    U/S reviewed, all wnl.    Discussed could have been an urinary or bowel issue.    Pt states the pain has gone now.        Return for follow up  appointment for annual or prn.    25 minutes face to face time spent with patient with greater than 50% of time spent counseling.     GYN Exam (Annual/6Wk PP)10 [facility]9/26/2016     Annual exam.. uterine fibroid.    Mammogram at [facility].    Health maintenance information given to patient.    influenza vaccination given today.     GYN Annual Exam/6wk PP Exam9/24/2015     Annual exam.  Thickened endometrial stripe.  Cystorectocele.  Influenza vaccine.    Mammogram at [facility].    Flu shot.    Health maintenance information given to patient.    Will expectantly manage the episodes of postmenopausal bleeding for months ago.     GYN Visit & F/U6/26/2015     Postmenopausal bleeding.  Thickened endometrial stripe.  Obesity.    At this time we'll proceed with expectant management patient is to return to clinic in 3 months for annual exam and transvaginal ultrasound to check endometrial stripe.  Patient was instructed that if she had any episodes of postmenopausal bleeding she would return to clinic in we will proceed with D&C hysteroscopy at [facility]     GYN Visit & F/U6/11/2015     Thickened endometrial Stripe    hx of Post menopausal bleed    S/P Gastric Sleeve surgery        Plan: Return for follow up appointment in 3-4 weeks with Dr Mckeon to discuss further recommendations of possible D&C or expectant management or sooner if needed.    Follow up with surgeon as scheduled.     GYN Visit & F/U5/28/2015     Postmenopausal bleeding.  Thickened endometrial stripe.    FSH today.    Endometrial biopsy and transvaginal ultrasound performed today.     GYN Visit & F/U3/5/2015     Obesity        Plan: watch diet    Increase exercise    Decrease stress.    Follow up with Dr Tyson as scheduled.     GYN Visit & F/U2/5/2015     Obesity        Plan: increase exercise    Follow diet as discussed, low carb.    Return for follow up appointment one month for last monthly weight check.     GYN Visit &  F/U1/6/2015     Obesity        Plan: Contiue Atkins diet    Start exercise, 30 minutes 4x weekly.    Continue CPAP as directed.    Return for follow up appointment one month.    Pt to see psych for evaluation and PCP for medical clearance.     GYN Visit & F/U12/1/2014     Obesity        Plan: Continue Atkins low carb diet, lean protein.    Exercise discussed.    Continue CPAP as directed.    Return for follow up appointment one month for weight check.     GYN Visit & F/U8/18/2014     Depression.  Obesity.  Hypercholesterolemia.  Sleep apnea.    Consult Dr. vergara for possible gastric sleeve.    Adipex 37.5 #30.    Continue Wellbutrin 150 SR daily.    Return to clinic in one month for phentermine prescription a patient desires.     GYN Exam (Annual/6Wk PP)107/22/2014     Annual exam.  Obesity.  Depression.  Atrophic vaginitis.    Mammogram and bone mineral density at [facility].    Information about the help including calcium, vitamin D and weightbearing exercises discussed with patient.    Detailed discussion with patient for approximately 25 min. with ways to improve activity including purchasing a fit band carb counts  including Atkins diet..  And exercise.    Patient requests Adipex 37.5 mg 1 by mouth daily.    Wellbutrin 150 mg XR one by mouth daily.    Estrace 0.5 g per vagina biweekly.  Return to clinic in one month to  check signs and symptoms.    Patient has her blood work drawn by her primary care provider.     GYN Visit & F/U8/15/2013     Endometritis.  Adenomyosis.  Disordered proliferative endometrium.    Doxycycline and Flagyl x2 weeks.  If patient has another episode of postmenopausal bleeding recommend treatment for prevention of endometrial hyperplasia with Prometrium 100 mg by mouth daily at bedtime.  Discussed exercise and weight loss.     Endometrial Biopsy7/29/2013     PMB,Submucosal calcification.    Patient did not tolerate dilatation of the cervix and did not tolerate office  hysteroscopy at all was unable to visualize the uterine cavity.  Patient was very uncomfortable with the procedure.    Consent signed and placed on chart.  Risks and benefits explained will proceed with outpt D&C hscope on wednesday     GYN Visit & F/U7/11/2013     Submucosal lesion in the endometrium.  Perimenopausal.    FSH today.  If elevated patient needs office hysteroscopy performed and will need pre-procedure medications.     GYN Exam (Annual/6Wk PP)106/24/2013     Annual.  Low-back pain.  Enlarged uterus.  Hyperlipidemia.  Seizure disorder.  Obesity.    Lab work to be performed by primary care provider.    Mammogram and transvaginal ultrasound at [facility].    Over-the-counter black cohosh remedies.    Calcium 2000 mg daily.    Vitamin D 2000 units daily.    Exercise.    Will get urine to check for hematuria, had another visit.  If hematuria noted patient needs renal ultrasound.     GYN Exam (Annual/6Wk PP)106/19/2012     Send copy of recent labs to primary care physician.  Mammogram at [facility].  Consult Dr. Henderson for screening colonoscopy.  Increased fiber.  Discussed weightbearing exercises calcium and vitamin D.       Current Facility-Administered Medications   Medication Dose Route Frequency Provider Last Rate Last Admin    0.9%  NaCl infusion   Intravenous Continuous Diane Higuera MD        famotidine (PF) 20 mg/2 mL injection             lactated ringers infusion   Intravenous Continuous León Smiley MD 10 mL/hr at 04/21/22 0711 New Bag at 04/21/22 0711    LIDOcaine (PF) 10 mg/ml (1%) injection 10 mg  1 mL Intradermal Once León Smiley MD         Review of patient's allergies indicates:   Allergen Reactions    Cephalexin     Pseudoephedrine-guaifenesin        10 point review of systems negative.    Objective:      Vitals:    04/21/22 0653 04/21/22 0654   BP: 119/76 119/76   BP Location: Left arm    Patient Position: Lying    Pulse: 66    Resp: 15    Temp:  "98.1 °F (36.7 °C)    TempSrc: Oral    SpO2: 100%    Weight: 88.5 kg (195 lb)    Height: 5' 6" (1.676 m)      Physical examination.  General well-developed well-nourished female in no acute distress.  Cardiovascular regular rate and rhythm.  Lungs nonlabored breathing, clear to auscultation bilateral  Abdomen soft nontender nondistended  Extremities no cyanosis clubbing or edema  Neuro afocal  Skin no rashes bruises or abrasions.         Assessment:  In need of screening colonoscopy.    Plan:  Screening colonoscopy.    Medical Decision Making/Counseling:  Risk and benefits of screening colonoscopy have been discussed in detail with the patient.  Risk of bleeding (significant 1 in 500 cases), perforation (1 in 5000 cases), aspiration, need for further surgeries, need for admission to the hospital, need for blood transfusions etcetera have all been discussed.  During the procedure, small polyps (colon growths) will be removed and any inflammation irritation or masslike structures will be biopsied.  Patient voiced understanding, and agreement.  After informed discussion with the patient in preoperative holding, they voiced understanding of the risk benefits of the outlined procedure and desired to proceed today with signed informed consent.  All questions were answered to the patient's satisfaction.  They will be followed up if there is any pathology to be reviewed in surgery clinic in the next couple of weeks for evaluation    "

## 2022-04-21 NOTE — PROGRESS NOTES
Pt having colonoscopy this morning. Stated completed all of bowel prep, and BMs beginning to become clear.

## 2022-04-26 LAB
FINAL PATHOLOGIC DIAGNOSIS: NORMAL
GROSS: NORMAL
Lab: NORMAL

## 2022-04-27 ENCOUNTER — TELEPHONE (OUTPATIENT)
Dept: SURGERY | Facility: CLINIC | Age: 62
End: 2022-04-27
Payer: COMMERCIAL

## 2022-04-27 NOTE — TELEPHONE ENCOUNTER
Called patient. Instructions given. Stated understanding. Recall entered. Follow up appointment cancelled.     ----- Message from Diane Higuera MD sent at 4/27/2022  8:19 AM CDT -----  Please let this patient know that based on her pathology from the colonoscopy she needs a repeat scope in 3 years.

## 2023-01-03 ENCOUNTER — OFFICE VISIT (OUTPATIENT)
Dept: PODIATRY | Facility: CLINIC | Age: 63
End: 2023-01-03
Payer: COMMERCIAL

## 2023-01-03 VITALS
WEIGHT: 210.38 LBS | HEIGHT: 66 IN | HEART RATE: 75 BPM | SYSTOLIC BLOOD PRESSURE: 113 MMHG | OXYGEN SATURATION: 98 % | BODY MASS INDEX: 33.81 KG/M2 | DIASTOLIC BLOOD PRESSURE: 76 MMHG

## 2023-01-03 DIAGNOSIS — M19.079 OSTEOARTHRITIS OF ANKLE AND FOOT, UNSPECIFIED LATERALITY: ICD-10-CM

## 2023-01-03 DIAGNOSIS — M72.2 PLANTAR FASCIITIS: Primary | ICD-10-CM

## 2023-01-03 PROCEDURE — 1160F RVW MEDS BY RX/DR IN RCRD: CPT | Mod: CPTII,S$GLB,, | Performed by: PODIATRIST

## 2023-01-03 PROCEDURE — 3074F PR MOST RECENT SYSTOLIC BLOOD PRESSURE < 130 MM HG: ICD-10-PCS | Mod: CPTII,S$GLB,, | Performed by: PODIATRIST

## 2023-01-03 PROCEDURE — 99203 OFFICE O/P NEW LOW 30 MIN: CPT | Mod: S$GLB,,, | Performed by: PODIATRIST

## 2023-01-03 PROCEDURE — 3008F BODY MASS INDEX DOCD: CPT | Mod: CPTII,S$GLB,, | Performed by: PODIATRIST

## 2023-01-03 PROCEDURE — 3008F PR BODY MASS INDEX (BMI) DOCUMENTED: ICD-10-PCS | Mod: CPTII,S$GLB,, | Performed by: PODIATRIST

## 2023-01-03 PROCEDURE — 99203 PR OFFICE/OUTPT VISIT, NEW, LEVL III, 30-44 MIN: ICD-10-PCS | Mod: S$GLB,,, | Performed by: PODIATRIST

## 2023-01-03 PROCEDURE — 3078F PR MOST RECENT DIASTOLIC BLOOD PRESSURE < 80 MM HG: ICD-10-PCS | Mod: CPTII,S$GLB,, | Performed by: PODIATRIST

## 2023-01-03 PROCEDURE — 3074F SYST BP LT 130 MM HG: CPT | Mod: CPTII,S$GLB,, | Performed by: PODIATRIST

## 2023-01-03 PROCEDURE — 99999 PR PBB SHADOW E&M-EST. PATIENT-LVL IV: ICD-10-PCS | Mod: PBBFAC,,, | Performed by: PODIATRIST

## 2023-01-03 PROCEDURE — 1159F PR MEDICATION LIST DOCUMENTED IN MEDICAL RECORD: ICD-10-PCS | Mod: CPTII,S$GLB,, | Performed by: PODIATRIST

## 2023-01-03 PROCEDURE — 1160F PR REVIEW ALL MEDS BY PRESCRIBER/CLIN PHARMACIST DOCUMENTED: ICD-10-PCS | Mod: CPTII,S$GLB,, | Performed by: PODIATRIST

## 2023-01-03 PROCEDURE — 3078F DIAST BP <80 MM HG: CPT | Mod: CPTII,S$GLB,, | Performed by: PODIATRIST

## 2023-01-03 PROCEDURE — 1159F MED LIST DOCD IN RCRD: CPT | Mod: CPTII,S$GLB,, | Performed by: PODIATRIST

## 2023-01-03 PROCEDURE — 99999 PR PBB SHADOW E&M-EST. PATIENT-LVL IV: CPT | Mod: PBBFAC,,, | Performed by: PODIATRIST

## 2023-01-03 RX ORDER — MELOXICAM 15 MG/1
15 TABLET ORAL DAILY
Qty: 30 TABLET | Refills: 0 | Status: SHIPPED | OUTPATIENT
Start: 2023-01-03 | End: 2023-01-24

## 2023-01-03 RX ORDER — MULTIVITAMIN
1 TABLET ORAL DAILY
COMMUNITY

## 2023-01-07 NOTE — PROGRESS NOTES
Subjective:       Patient ID: Tess Pearson is a 62 y.o. female.    Chief Complaint: Foot Pain (Right heel pain)  Patient presents today she is complaining of right heel pain and swelling on the top of the left foot which has gotten better.  Patient relates her right heel has been bothering her since November it is especially painful when she 1st gets out of bed in the morning after she starts walking after sitting for a period of time she had been having swelling on the top of her left foot which has progressively gotten better this also started around November.  Patient has not tried any anti-inflammatories and states that she is frequently barefoot or wearing socks when she is in the house.    Past Medical History:   Diagnosis Date    Acid reflux     Epilepsy     Hyperlipidemia     Thyroid disease      Past Surgical History:   Procedure Laterality Date    COLONOSCOPY N/A 4/21/2022    Procedure: COLONOSCOPY;  Surgeon: Diane Higuera MD;  Location: [facility] ENDO;  Service: General;  Laterality: N/A;    COLONOSCOPY W/ BIOPSIES  2016    [facility]    ENDOMETRIAL ABLATION      gastric sleeve      HYSTEROSCOPIC POLYPECTOMY OF UTERUS N/A 12/16/2020    Procedure: POLYPECTOMY, UTERUS, HYSTEROSCOPIC;  Surgeon: Andrew Mckeon MD;  Location: [facility] OR;  Service: OB/GYN;  Laterality: N/A;    HYSTEROSCOPY WITH DILATION AND CURETTAGE OF UTERUS N/A 12/16/2020    Procedure: HYSTEROSCOPY, WITH DILATION AND CURETTAGE OF UTERUS;  Surgeon: Andrew Mckeon MD;  Location: [facility] OR;  Service: OB/GYN;  Laterality: N/A;     Family History   Problem Relation Age of Onset    Heart failure Father     Hyperlipidemia Father     Hypertension Father     Prostate cancer Father     Heart murmur Mother     Hyperlipidemia Mother     Hypertension Mother      Social History     Socioeconomic History    Marital status:    Tobacco Use    Smoking status: Never    Smokeless tobacco: Never   Substance and Sexual Activity    Alcohol use: No    Drug  use: No    Sexual activity: Yes     Partners: Male     Birth control/protection: Post-menopausal   Social History Narrative    ADVANCED MD PLANS        GYN Exam (Annual/6Wk PP)10 Charu11/25/2019     Annual    Hypothyroidism    Uterine Fibroids        Visit Summary    Pap done    Mammo @ [facility]    Colonscopy up to date    Labs done with Dr Vargas    Healthy diet and exercise discussed    TVUS today: reassuring    Flu shot        Return for follow up appointment in one year or prn.     GYN Exam (Annual/6Wk PP)10 Charu11/19/2018     Annual exam.  Hypothyroidism.  Chronic/recurrent UTIs.  Uterine fibroids.    Mammogram at [facility].    PCP: Dr. Vargas.    Urine for culture and sensitivity.    Consult Dr. Cleaning secondary to recurrent UTIs.    Visit Summary    Ordered:    VAG U/S NON OB     GYN Visit & [id number] CHARU1/23/2018     Chronic urinary tract infection    Lower back pain        Visit Summary    Ordered:    Urine Culture, Routine    If culture positive, refer to urology.    Pt just finished antibiotics x one month.    Pt has had three positive urine cultures in the past 3-4 months.        Return for follow up appointment prn/annual.     GYN Visit & [id number] CHARU12/6/2017     Urinary Tract Infection    Ear pain    Allergic Rhinitis        Visit Summary    Pt has Benadryl Sinus at home to take    Ordered:    UA w/o Micro for culture        Return for follow up appointment prn/annual.     GYN Visit & [id number] CHARU11/13/2017     Urinary Tract Infection    Recently diagnosed with Hypothyroidism        Visit Summary    Repeat Urine Culture today    Pt was asymptomatic    Follow up with Dr Vargas as scheduled.    Follow up with Cardiologist as scheduled        Return for follow up appointment prn/annual.     GYN Exam (Annual/6Wk PP)10 Charu10/16/2017     Annual exam.   right lower quadrant pain.  Uterine fibroids.    Mammogram at [facility].    Transvaginal ultrasound today.        Visit Summary     Ordered:    Urine Culture, Routine    Pap IG, rfx HPV ASCU     GYN Visit & [id number] CHARU1/10/2017     Pelvic Pain: resolved    R Flank pain resolved        Visit Summary    U/S reviewed, all wnl.    Discussed could have been an urinary or bowel issue.    Pt states the pain has gone now.        Return for follow up appointment for annual or prn.    25 minutes face to face time spent with patient with greater than 50% of time spent counseling.     GYN Exam (Annual/6Wk PP)10 Charu9/26/2016     Annual exam.. uterine fibroid.    Mammogram at [facility].    Health maintenance information given to patient.    influenza vaccination given today.     GYN Annual Exam/6wk PP Exam9/24/2015     Annual exam.  Thickened endometrial stripe.  Cystorectocele.  Influenza vaccine.    Mammogram at [facility].    Flu shot.    Health maintenance information given to patient.    Will expectantly manage the episodes of postmenopausal bleeding for months ago.     GYN Visit & F/U6/26/2015     Postmenopausal bleeding.  Thickened endometrial stripe.  Obesity.    At this time we'll proceed with expectant management patient is to return to clinic in 3 months for annual exam and transvaginal ultrasound to check endometrial stripe.  Patient was instructed that if she had any episodes of postmenopausal bleeding she would return to clinic in we will proceed with D&C hysteroscopy at [facility]     GYN Visit & F/U6/11/2015     Thickened endometrial Stripe    hx of Post menopausal bleed    S/P Gastric Sleeve surgery        Plan: Return for follow up appointment in 3-4 weeks with Dr Mckeon to discuss further recommendations of possible D&C or expectant management or sooner if needed.    Follow up with surgeon as scheduled.     GYN Visit & F/U5/28/2015     Postmenopausal bleeding.  Thickened endometrial stripe.    FSH today.    Endometrial biopsy and transvaginal ultrasound performed today.     GYN Visit & F/U3/5/2015      Obesity        Plan: watch diet    Increase exercise    Decrease stress.    Follow up with Dr Vergara as scheduled.     GYN Visit & F/U2/5/2015     Obesity        Plan: increase exercise    Follow diet as discussed, low carb.    Return for follow up appointment one month for last monthly weight check.     GYN Visit & F/U1/6/2015     Obesity        Plan: Contiue Atkins diet    Start exercise, 30 minutes 4x weekly.    Continue CPAP as directed.    Return for follow up appointment one month.    Pt to see psych for evaluation and PCP for medical clearance.     GYN Visit & F/U12/1/2014     Obesity        Plan: Continue Atkins low carb diet, lean protein.    Exercise discussed.    Continue CPAP as directed.    Return for follow up appointment one month for weight check.     GYN Visit & F/U8/18/2014     Depression.  Obesity.  Hypercholesterolemia.  Sleep apnea.    Consult Dr. vergara for possible gastric sleeve.    Adipex 37.5 #30.    Continue Wellbutrin 150 SR daily.    Return to clinic in one month for phentermine prescription a patient desires.     GYN Exam (Annual/6Wk PP)107/22/2014     Annual exam.  Obesity.  Depression.  Atrophic vaginitis.    Mammogram and bone mineral density at [facility].    Information about the help including calcium, vitamin D and weightbearing exercises discussed with patient.    Detailed discussion with patient for approximately 25 min. with ways to improve activity including purchasing a fit band carb counts  including Atkins diet..  And exercise.    Patient requests Adipex 37.5 mg 1 by mouth daily.    Wellbutrin 150 mg XR one by mouth daily.    Estrace 0.5 g per vagina biweekly.  Return to clinic in one month to  check signs and symptoms.    Patient has her blood work drawn by her primary care provider.     GYN Visit & F/U8/15/2013     Endometritis.  Adenomyosis.  Disordered proliferative endometrium.    Doxycycline and Flagyl x2 weeks.  If patient has another episode of  postmenopausal bleeding recommend treatment for prevention of endometrial hyperplasia with Prometrium 100 mg by mouth daily at bedtime.  Discussed exercise and weight loss.     Endometrial Biopsy7/29/2013     PMB,Submucosal calcification.    Patient did not tolerate dilatation of the cervix and did not tolerate office hysteroscopy at all was unable to visualize the uterine cavity.  Patient was very uncomfortable with the procedure.    Consent signed and placed on chart.  Risks and benefits explained will proceed with outpt D&C hscope on wednesday     GYN Visit & F/U7/11/2013     Submucosal lesion in the endometrium.  Perimenopausal.    FSH today.  If elevated patient needs office hysteroscopy performed and will need pre-procedure medications.     GYN Exam (Annual/6Wk PP)106/24/2013     Annual.  Low-back pain.  Enlarged uterus.  Hyperlipidemia.  Seizure disorder.  Obesity.    Lab work to be performed by primary care provider.    Mammogram and transvaginal ultrasound at [facility].    Over-the-counter black cohosh remedies.    Calcium 2000 mg daily.    Vitamin D 2000 units daily.    Exercise.    Will get urine to check for hematuria, had another visit.  If hematuria noted patient needs renal ultrasound.     GYN Exam (Annual/6Wk PP)106/19/2012     Send copy of recent labs to primary care physician.  Mammogram at [facility].  Consult Dr. Henderson for screening colonoscopy.  Increased fiber.  Discussed weightbearing exercises calcium and vitamin D.       Current Outpatient Medications   Medication Sig Dispense Refill    atorvastatin (LIPITOR) 80 MG tablet TAKE 1 TABLET EVERY EVENING AFTER SUPPER.      calcium carbonate (CALCIUM 300 ORAL) Take by mouth.      cyanocobalamin (VITAMIN B-12) 1000 MCG tablet Take 100 mcg by mouth once daily.      famotidine (PEPCID) 20 MG tablet 20 mg.      fluticasone propionate (FLONASE) 50 mcg/actuation nasal spray       levothyroxine (SYNTHROID) 75 MCG tablet Take 75  "mcg by mouth once daily.      metoprolol succinate (TOPROL-XL) 25 MG 24 hr tablet Take 12.5 mg by mouth once daily.      multivitamin (ONE DAILY MULTIVITAMIN) per tablet Take 1 tablet by mouth once daily.      omeprazole (PRILOSEC) 40 MG capsule TAKE 1 CAPSULE BY MOUTH EVERY DAY 30 MINUTES BEFORE MORNING MEAL FOR 90 DAYS      PHENobarbital (LUMINAL) 32.4 MG tablet   5    meloxicam (MOBIC) 15 MG tablet Take 1 tablet (15 mg total) by mouth once daily. 30 tablet 0     No current facility-administered medications for this visit.     Review of patient's allergies indicates:   Allergen Reactions    Cephalexin     Pseudoephedrine-guaifenesin        Review of Systems   Musculoskeletal:  Positive for arthralgias.   All other systems reviewed and are negative.    Objective:      Vitals:    01/03/23 1138   BP: 113/76   Pulse: 75   SpO2: 98%   Weight: 95.4 kg (210 lb 6.4 oz)   Height: 5' 6" (1.676 m)     Physical Exam  Vitals and nursing note reviewed.   Constitutional:       Appearance: Normal appearance.   Cardiovascular:      Pulses:           Dorsalis pedis pulses are 1+ on the right side and 1+ on the left side.        Posterior tibial pulses are 1+ on the right side and 1+ on the left side.   Pulmonary:      Effort: Pulmonary effort is normal.   Musculoskeletal:         General: Swelling and tenderness present.        Feet:    Feet:      Right foot:      Protective Sensation: 2 sites tested.  2 sites sensed.      Skin integrity: Erythema and warmth present.      Left foot:      Protective Sensation: 2 sites tested.  2 sites sensed.   Skin:     Capillary Refill: Capillary refill takes 2 to 3 seconds.      Findings: Erythema present.   Neurological:      General: No focal deficit present.      Mental Status: She is alert.   Psychiatric:         Mood and Affect: Mood normal.         Behavior: Behavior normal.            Assessment:       1. Plantar fasciitis - Right Foot    2. Osteoarthritis of ankle and foot, unspecified " laterality          Plan:       Patient presents today she is complaining of right heel pain and swelling on the top of the left foot which has gotten better.  Patient relates her right heel has been bothering her since November it is especially painful when she 1st gets out of bed in the morning after she starts walking after sitting for a period of time she had been having swelling on the top of her left foot which has progressively gotten better this also started around November.  Patient has not tried any anti-inflammatories and states that she is frequently barefoot or wearing socks when she is in the house.  A comprehensive new patient evaluation was performed today I advised the patient she has findings consistent with plantar fasciitis right and explained to the patient she has to wear good shoes with good supportive arch at all times I also explained to the patient even though her left foot is doing better she does have some degenerative arthritic changes that are palpable in the midfoot bilateral and it is likely lack of appropriate support in the left arch puts stress on the midfoot cause this area to swell became painful tender while it is doing better she needs to wear good support on both feet at all times she needs to discontinue all barefoot walking and needs to wear supportive shoes with additional support in the house at all times.  Patient was started on Mobic 15 mg daily I have also dispensed the patient small blue arch pads I put these in the patient's shoes gave her additional pads to put in her other shoes I will re-evaluate her in 3 weeks I advised the patient we can always give her more support as necessary.  Patient advised she should improve in between now and her re-evaluation but will have to determine whether not she is getting enough arch support when I see her for follow-up.This note was created using [employer] voice recognition software that occasionally misinterpreted phrases or  words.

## 2023-01-24 ENCOUNTER — OFFICE VISIT (OUTPATIENT)
Dept: PODIATRY | Facility: CLINIC | Age: 63
End: 2023-01-24
Payer: COMMERCIAL

## 2023-01-24 VITALS
DIASTOLIC BLOOD PRESSURE: 76 MMHG | HEART RATE: 69 BPM | BODY MASS INDEX: 33.8 KG/M2 | SYSTOLIC BLOOD PRESSURE: 110 MMHG | WEIGHT: 210.31 LBS | HEIGHT: 66 IN

## 2023-01-24 DIAGNOSIS — M19.079 OSTEOARTHRITIS OF ANKLE AND FOOT, UNSPECIFIED LATERALITY: ICD-10-CM

## 2023-01-24 DIAGNOSIS — M72.2 PLANTAR FASCIITIS: Primary | ICD-10-CM

## 2023-01-24 PROCEDURE — 3074F SYST BP LT 130 MM HG: CPT | Mod: CPTII,S$GLB,, | Performed by: PODIATRIST

## 2023-01-24 PROCEDURE — 99214 OFFICE O/P EST MOD 30 MIN: CPT | Mod: S$GLB,,, | Performed by: PODIATRIST

## 2023-01-24 PROCEDURE — 3008F BODY MASS INDEX DOCD: CPT | Mod: CPTII,S$GLB,, | Performed by: PODIATRIST

## 2023-01-24 PROCEDURE — 1159F PR MEDICATION LIST DOCUMENTED IN MEDICAL RECORD: ICD-10-PCS | Mod: CPTII,S$GLB,, | Performed by: PODIATRIST

## 2023-01-24 PROCEDURE — 3078F DIAST BP <80 MM HG: CPT | Mod: CPTII,S$GLB,, | Performed by: PODIATRIST

## 2023-01-24 PROCEDURE — 99999 PR PBB SHADOW E&M-EST. PATIENT-LVL III: CPT | Mod: PBBFAC,,, | Performed by: PODIATRIST

## 2023-01-24 PROCEDURE — 3074F PR MOST RECENT SYSTOLIC BLOOD PRESSURE < 130 MM HG: ICD-10-PCS | Mod: CPTII,S$GLB,, | Performed by: PODIATRIST

## 2023-01-24 PROCEDURE — 3078F PR MOST RECENT DIASTOLIC BLOOD PRESSURE < 80 MM HG: ICD-10-PCS | Mod: CPTII,S$GLB,, | Performed by: PODIATRIST

## 2023-01-24 PROCEDURE — 1160F PR REVIEW ALL MEDS BY PRESCRIBER/CLIN PHARMACIST DOCUMENTED: ICD-10-PCS | Mod: CPTII,S$GLB,, | Performed by: PODIATRIST

## 2023-01-24 PROCEDURE — 3008F PR BODY MASS INDEX (BMI) DOCUMENTED: ICD-10-PCS | Mod: CPTII,S$GLB,, | Performed by: PODIATRIST

## 2023-01-24 PROCEDURE — 99214 PR OFFICE/OUTPT VISIT, EST, LEVL IV, 30-39 MIN: ICD-10-PCS | Mod: S$GLB,,, | Performed by: PODIATRIST

## 2023-01-24 PROCEDURE — 99999 PR PBB SHADOW E&M-EST. PATIENT-LVL III: ICD-10-PCS | Mod: PBBFAC,,, | Performed by: PODIATRIST

## 2023-01-24 PROCEDURE — 1159F MED LIST DOCD IN RCRD: CPT | Mod: CPTII,S$GLB,, | Performed by: PODIATRIST

## 2023-01-24 PROCEDURE — 1160F RVW MEDS BY RX/DR IN RCRD: CPT | Mod: CPTII,S$GLB,, | Performed by: PODIATRIST

## 2023-01-24 RX ORDER — AZELASTINE 1 MG/ML
2 SPRAY, METERED NASAL 2 TIMES DAILY
COMMUNITY
Start: 2023-01-16

## 2023-01-24 RX ORDER — CLOTRIMAZOLE 1 %
CREAM (GRAM) TOPICAL 2 TIMES DAILY
COMMUNITY
Start: 2023-01-16

## 2023-01-24 RX ORDER — PSEUDOEPHEDRINE HCL 30 MG
30 TABLET ORAL 3 TIMES DAILY
COMMUNITY
Start: 2023-01-16

## 2023-01-24 RX ORDER — MELOXICAM 7.5 MG/1
7.5 TABLET ORAL 2 TIMES DAILY
Qty: 60 TABLET | Refills: 0 | Status: SHIPPED | OUTPATIENT
Start: 2023-01-24 | End: 2023-02-22

## 2023-01-24 RX ORDER — DOXYCYCLINE 100 MG/1
100 CAPSULE ORAL 2 TIMES DAILY
COMMUNITY
Start: 2023-01-17

## 2023-01-24 RX ORDER — HYDROCODONE BITARTRATE AND ACETAMINOPHEN 7.5; 325 MG/15ML; MG/15ML
SOLUTION ORAL
COMMUNITY
Start: 2023-01-16

## 2023-01-28 NOTE — PROGRESS NOTES
Subjective:       Patient ID: Tess Pearson is a 62 y.o. female.    Chief Complaint: Follow-up (Right foot pain)  Patient presents today for follow-up of right foot pain.    Past Medical History:   Diagnosis Date    Acid reflux     Epilepsy     Hyperlipidemia     Thyroid disease      Past Surgical History:   Procedure Laterality Date    COLONOSCOPY N/A 4/21/2022    Procedure: COLONOSCOPY;  Surgeon: Diane Higuera MD;  Location: [facility] ENDO;  Service: General;  Laterality: N/A;    COLONOSCOPY W/ BIOPSIES  2016    [facility]    ENDOMETRIAL ABLATION      gastric sleeve      HYSTEROSCOPIC POLYPECTOMY OF UTERUS N/A 12/16/2020    Procedure: POLYPECTOMY, UTERUS, HYSTEROSCOPIC;  Surgeon: Andrew Mckeon MD;  Location: [facility] OR;  Service: OB/GYN;  Laterality: N/A;    HYSTEROSCOPY WITH DILATION AND CURETTAGE OF UTERUS N/A 12/16/2020    Procedure: HYSTEROSCOPY, WITH DILATION AND CURETTAGE OF UTERUS;  Surgeon: Andrew Mckeon MD;  Location: [facility] OR;  Service: OB/GYN;  Laterality: N/A;     Family History   Problem Relation Age of Onset    Heart failure Father     Hyperlipidemia Father     Hypertension Father     Prostate cancer Father     Heart murmur Mother     Hyperlipidemia Mother     Hypertension Mother      Social History     Socioeconomic History    Marital status:    Tobacco Use    Smoking status: Never    Smokeless tobacco: Never   Substance and Sexual Activity    Alcohol use: No    Drug use: No    Sexual activity: Yes     Partners: Male     Birth control/protection: Post-menopausal   Social History Narrative    ADVANCED MD PLANS        GYN Exam (Annual/6Wk PP)10 Charu11/25/2019     Annual    Hypothyroidism    Uterine Fibroids        Visit Summary    Pap done    Mammo @ [facility]    Colonscopy up to date    Labs done with Dr Vargas    Healthy diet and exercise discussed    TVUS today: reassuring    Flu shot        Return for follow up appointment in one year or prn.     GYN Exam (Annual/6Wk PP)10 Charu11/19/2018      Annual exam.  Hypothyroidism.  Chronic/recurrent UTIs.  Uterine fibroids.    Mammogram at [facility].    PCP: Dr. Vargas.    Urine for culture and sensitivity.    Consult Dr. Cleaning secondary to recurrent UTIs.    Visit Summary    Ordered:    VAG U/S NON OB     GYN Visit & [id number] CHARU1/23/2018     Chronic urinary tract infection    Lower back pain        Visit Summary    Ordered:    Urine Culture, Routine    If culture positive, refer to urology.    Pt just finished antibiotics x one month.    Pt has had three positive urine cultures in the past 3-4 months.        Return for follow up appointment prn/annual.     GYN Visit & [id number] CHARU12/6/2017     Urinary Tract Infection    Ear pain    Allergic Rhinitis        Visit Summary    Pt has Benadryl Sinus at home to take    Ordered:    UA w/o Micro for culture        Return for follow up appointment prn/annual.     GYN Visit & [id number] CHARU11/13/2017     Urinary Tract Infection    Recently diagnosed with Hypothyroidism        Visit Summary    Repeat Urine Culture today    Pt was asymptomatic    Follow up with Dr Vargas as scheduled.    Follow up with Cardiologist as scheduled        Return for follow up appointment prn/annual.     GYN Exam (Annual/6Wk PP)10 Charu10/16/2017     Annual exam.   right lower quadrant pain.  Uterine fibroids.    Mammogram at [facility].    Transvaginal ultrasound today.        Visit Summary    Ordered:    Urine Culture, Routine    Pap IG, rfx HPV ASCU     GYN Visit & [id number] CHARU1/10/2017     Pelvic Pain: resolved    R Flank pain resolved        Visit Summary    U/S reviewed, all wnl.    Discussed could have been an urinary or bowel issue.    Pt states the pain has gone now.        Return for follow up appointment for annual or prn.    25 minutes face to face time spent with patient with greater than 50% of time spent counseling.     GYN Exam (Annual/6Wk PP)10 Charu9/26/2016     Annual exam.. uterine fibroid.     Mammogram at [facility].    Health maintenance information given to patient.    influenza vaccination given today.     GYN Annual Exam/6wk PP Exam9/24/2015     Annual exam.  Thickened endometrial stripe.  Cystorectocele.  Influenza vaccine.    Mammogram at [facility].    Flu shot.    Health maintenance information given to patient.    Will expectantly manage the episodes of postmenopausal bleeding for months ago.     GYN Visit & F/U6/26/2015     Postmenopausal bleeding.  Thickened endometrial stripe.  Obesity.    At this time we'll proceed with expectant management patient is to return to clinic in 3 months for annual exam and transvaginal ultrasound to check endometrial stripe.  Patient was instructed that if she had any episodes of postmenopausal bleeding she would return to clinic in we will proceed with D&C hysteroscopy at [facility]     GYN Visit & F/U6/11/2015     Thickened endometrial Stripe    hx of Post menopausal bleed    S/P Gastric Sleeve surgery        Plan: Return for follow up appointment in 3-4 weeks with Dr Mckeon to discuss further recommendations of possible D&C or expectant management or sooner if needed.    Follow up with surgeon as scheduled.     GYN Visit & F/U5/28/2015     Postmenopausal bleeding.  Thickened endometrial stripe.    FSH today.    Endometrial biopsy and transvaginal ultrasound performed today.     GYN Visit & F/U3/5/2015     Obesity        Plan: watch diet    Increase exercise    Decrease stress.    Follow up with Dr Tyson as scheduled.     GYN Visit & F/U2/5/2015     Obesity        Plan: increase exercise    Follow diet as discussed, low carb.    Return for follow up appointment one month for last monthly weight check.     GYN Visit & F/U1/6/2015     Obesity        Plan: Contiue Atkins diet    Start exercise, 30 minutes 4x weekly.    Continue CPAP as directed.    Return for follow up appointment one month.    Pt to see psych for evaluation and  PCP for medical clearance.     GYN Visit & F/U12/1/2014     Obesity        Plan: Continue Atkins low carb diet, lean protein.    Exercise discussed.    Continue CPAP as directed.    Return for follow up appointment one month for weight check.     GYN Visit & F/U8/18/2014     Depression.  Obesity.  Hypercholesterolemia.  Sleep apnea.    Consult Dr. vergara for possible gastric sleeve.    Adipex 37.5 #30.    Continue Wellbutrin 150 SR daily.    Return to clinic in one month for phentermine prescription a patient desires.     GYN Exam (Annual/6Wk PP)107/22/2014     Annual exam.  Obesity.  Depression.  Atrophic vaginitis.    Mammogram and bone mineral density at [facility].    Information about the help including calcium, vitamin D and weightbearing exercises discussed with patient.    Detailed discussion with patient for approximately 25 min. with ways to improve activity including purchasing a fit band carb counts  including Atkins diet..  And exercise.    Patient requests Adipex 37.5 mg 1 by mouth daily.    Wellbutrin 150 mg XR one by mouth daily.    Estrace 0.5 g per vagina biweekly.  Return to clinic in one month to  check signs and symptoms.    Patient has her blood work drawn by her primary care provider.     GYN Visit & F/U8/15/2013     Endometritis.  Adenomyosis.  Disordered proliferative endometrium.    Doxycycline and Flagyl x2 weeks.  If patient has another episode of postmenopausal bleeding recommend treatment for prevention of endometrial hyperplasia with Prometrium 100 mg by mouth daily at bedtime.  Discussed exercise and weight loss.     Endometrial Biopsy7/29/2013     PMB,Submucosal calcification.    Patient did not tolerate dilatation of the cervix and did not tolerate office hysteroscopy at all was unable to visualize the uterine cavity.  Patient was very uncomfortable with the procedure.    Consent signed and placed on chart.  Risks and benefits explained will proceed with outpt D&C hscope  on wednesday     GYN Visit & F/     Submucosal lesion in the endometrium.  Perimenopausal.    FSH today.  If elevated patient needs office hysteroscopy performed and will need pre-procedure medications.     GYN Exam (Annual/6Wk PP)     Annual.  Low-back pain.  Enlarged uterus.  Hyperlipidemia.  Seizure disorder.  Obesity.    Lab work to be performed by primary care provider.    Mammogram and transvaginal ultrasound at [facility].    Over-the-counter black cohosh remedies.    Calcium 2000 mg daily.    Vitamin D 2000 units daily.    Exercise.    Will get urine to check for hematuria, had another visit.  If hematuria noted patient needs renal ultrasound.     GYN Exam (Annual/6Wk PP)     Send copy of recent labs to primary care physician.  Mammogram at [facility].  Consult Dr. Henderson for screening colonoscopy.  Increased fiber.  Discussed weightbearing exercises calcium and vitamin D.       Current Outpatient Medications   Medication Sig Dispense Refill    atorvastatin (LIPITOR) 80 MG tablet TAKE 1 TABLET EVERY EVENING AFTER SUPPER.      azelastine (ASTELIN) 137 mcg (0.1 %) nasal spray 2 sprays 2 (two) times daily.      calcium carbonate (CALCIUM 300 ORAL) Take by mouth.      clotrimazole (LOTRIMIN) 1 % cream 2 (two) times daily.      cyanocobalamin (VITAMIN B-12) 1000 MCG tablet Take 100 mcg by mouth once daily.      famotidine (PEPCID) 20 MG tablet 20 mg.      fluticasone propionate (FLONASE) 50 mcg/actuation nasal spray       hydrocodone-acetaminophen (HYCET) solution 7.5-325 mg/15mL SMARTSI-2 Teaspoon By Mouth Every 4-6 Hours PRN      levothyroxine (SYNTHROID) 75 MCG tablet Take 75 mcg by mouth once daily.      metoprolol succinate (TOPROL-XL) 25 MG 24 hr tablet Take 12.5 mg by mouth once daily.      multivitamin (THERAGRAN) per tablet Take 1 tablet by mouth once daily.      omeprazole (PRILOSEC) 40 MG capsule TAKE 1 CAPSULE BY MOUTH EVERY DAY 30 MINUTES  "BEFORE MORNING MEAL FOR 90 DAYS      PHENobarbital (LUMINAL) 32.4 MG tablet   5    pseudoephedrine (SUDAFED) 30 MG tablet Take 30 mg by mouth 3 (three) times daily.      doxycycline (VIBRAMYCIN) 100 MG Cap Take 100 mg by mouth 2 (two) times daily.      meloxicam (MOBIC) 7.5 MG tablet Take 1 tablet (7.5 mg total) by mouth 2 (two) times daily. 60 tablet 0     No current facility-administered medications for this visit.     Review of patient's allergies indicates:   Allergen Reactions    Cephalexin     Pseudoephedrine-guaifenesin        Review of Systems   Musculoskeletal:  Positive for arthralgias.   All other systems reviewed and are negative.    Objective:      Vitals:    01/24/23 1601   BP: 110/76   Pulse: 69   Weight: 95.4 kg (210 lb 5.1 oz)   Height: 5' 6" (1.676 m)     Physical Exam  Vitals and nursing note reviewed.   Constitutional:       Appearance: Normal appearance.   Cardiovascular:      Pulses:           Dorsalis pedis pulses are 1+ on the right side and 1+ on the left side.        Posterior tibial pulses are 1+ on the right side and 1+ on the left side.   Pulmonary:      Effort: Pulmonary effort is normal.   Musculoskeletal:         General: Swelling and tenderness present.        Feet:    Feet:      Right foot:      Protective Sensation: 2 sites tested.  2 sites sensed.      Skin integrity: Erythema and warmth present.      Left foot:      Protective Sensation: 2 sites tested.  2 sites sensed.   Skin:     Capillary Refill: Capillary refill takes 2 to 3 seconds.      Findings: Erythema present.   Neurological:      General: No focal deficit present.      Mental Status: She is alert.   Psychiatric:         Mood and Affect: Mood normal.         Behavior: Behavior normal.            Assessment:       1. Plantar fasciitis - Right Foot    2. Osteoarthritis of ankle and foot, unspecified laterality          Plan:       Patient presents today for follow-up of right foot pain she states the rest of her foot is " doing very good the arch no longer hurts now the pain is just in the right heel area.  Patient did not tolerate the meloxicam 15 mg daily it did cause her stomach some problems so I did change her to meloxicam 7.5 mg were going to have her try to take this twice a day but if she is not tolerating it she can just take it once a day she needs to have some type of anti-inflammatory to help address the patient's heel pain.  Patient clearly needs more arch support than what she is currently getting therefore I dispensed the patient some power step full length arch supports I want her to wear these at all times of weight-bearing I also added a little bit of a heel lift to softening cushion the patient's right heel but made the patient aware she needs to wear the power steps at all times she was still going barefoot when she is in the house and again I explained to the patient being barefoot is the worst possible thing she can do.  Patient advised we can always add additional support if necessary to the power steps ultimately she can take the heel pads off if they are causing her that much difficulty but I wanted her to try to use the heel pads to give her more soft cushion.  Recommended follow-up 2 weeks unless the patient has any problems questions or concerns prior to that time she is to discontinue all barefoot walking.  This note was created using [employer] voice recognition software that occasionally misinterpreted phrases or words.

## 2023-02-23 ENCOUNTER — OFFICE VISIT (OUTPATIENT)
Dept: PODIATRY | Facility: CLINIC | Age: 63
End: 2023-02-23
Payer: COMMERCIAL

## 2023-02-23 VITALS
DIASTOLIC BLOOD PRESSURE: 78 MMHG | WEIGHT: 210.31 LBS | BODY MASS INDEX: 33.8 KG/M2 | HEIGHT: 66 IN | HEART RATE: 70 BPM | SYSTOLIC BLOOD PRESSURE: 128 MMHG

## 2023-02-23 DIAGNOSIS — G57.51 TARSAL TUNNEL SYNDROME OF RIGHT SIDE: ICD-10-CM

## 2023-02-23 DIAGNOSIS — M19.079 OSTEOARTHRITIS OF ANKLE AND FOOT, UNSPECIFIED LATERALITY: ICD-10-CM

## 2023-02-23 DIAGNOSIS — M72.2 PLANTAR FASCIITIS: Primary | ICD-10-CM

## 2023-02-23 PROCEDURE — 1160F PR REVIEW ALL MEDS BY PRESCRIBER/CLIN PHARMACIST DOCUMENTED: ICD-10-PCS | Mod: CPTII,S$GLB,, | Performed by: PODIATRIST

## 2023-02-23 PROCEDURE — 99999 PR PBB SHADOW E&M-EST. PATIENT-LVL IV: CPT | Mod: PBBFAC,,, | Performed by: PODIATRIST

## 2023-02-23 PROCEDURE — 3074F SYST BP LT 130 MM HG: CPT | Mod: CPTII,S$GLB,, | Performed by: PODIATRIST

## 2023-02-23 PROCEDURE — 1160F RVW MEDS BY RX/DR IN RCRD: CPT | Mod: CPTII,S$GLB,, | Performed by: PODIATRIST

## 2023-02-23 PROCEDURE — 99214 PR OFFICE/OUTPT VISIT, EST, LEVL IV, 30-39 MIN: ICD-10-PCS | Mod: S$GLB,,, | Performed by: PODIATRIST

## 2023-02-23 PROCEDURE — 1159F MED LIST DOCD IN RCRD: CPT | Mod: CPTII,S$GLB,, | Performed by: PODIATRIST

## 2023-02-23 PROCEDURE — 99214 OFFICE O/P EST MOD 30 MIN: CPT | Mod: S$GLB,,, | Performed by: PODIATRIST

## 2023-02-23 PROCEDURE — 3008F PR BODY MASS INDEX (BMI) DOCUMENTED: ICD-10-PCS | Mod: CPTII,S$GLB,, | Performed by: PODIATRIST

## 2023-02-23 PROCEDURE — 3008F BODY MASS INDEX DOCD: CPT | Mod: CPTII,S$GLB,, | Performed by: PODIATRIST

## 2023-02-23 PROCEDURE — 3078F PR MOST RECENT DIASTOLIC BLOOD PRESSURE < 80 MM HG: ICD-10-PCS | Mod: CPTII,S$GLB,, | Performed by: PODIATRIST

## 2023-02-23 PROCEDURE — 1159F PR MEDICATION LIST DOCUMENTED IN MEDICAL RECORD: ICD-10-PCS | Mod: CPTII,S$GLB,, | Performed by: PODIATRIST

## 2023-02-23 PROCEDURE — 3078F DIAST BP <80 MM HG: CPT | Mod: CPTII,S$GLB,, | Performed by: PODIATRIST

## 2023-02-23 PROCEDURE — 99999 PR PBB SHADOW E&M-EST. PATIENT-LVL IV: ICD-10-PCS | Mod: PBBFAC,,, | Performed by: PODIATRIST

## 2023-02-23 PROCEDURE — 3074F PR MOST RECENT SYSTOLIC BLOOD PRESSURE < 130 MM HG: ICD-10-PCS | Mod: CPTII,S$GLB,, | Performed by: PODIATRIST

## 2023-02-23 RX ORDER — LORATADINE 10 MG/1
10 TABLET ORAL DAILY
COMMUNITY

## 2023-02-25 PROBLEM — G57.51 TARSAL TUNNEL SYNDROME OF RIGHT SIDE: Status: ACTIVE | Noted: 2023-02-25

## 2023-02-26 NOTE — PROGRESS NOTES
Subjective:       Patient ID: Tess Pearson is a 62 y.o. female.    Chief Complaint: Follow-up (Plantar fascitis right foot)  Patient presents today for follow-up of right foot pain.    Past Medical History:   Diagnosis Date    Acid reflux     Epilepsy     Hyperlipidemia     Thyroid disease      Past Surgical History:   Procedure Laterality Date    COLONOSCOPY N/A 4/21/2022    Procedure: COLONOSCOPY;  Surgeon: Diane Higuera MD;  Location: [facility] ENDO;  Service: General;  Laterality: N/A;    COLONOSCOPY W/ BIOPSIES  2016    [facility]    ENDOMETRIAL ABLATION      gastric sleeve      HYSTEROSCOPIC POLYPECTOMY OF UTERUS N/A 12/16/2020    Procedure: POLYPECTOMY, UTERUS, HYSTEROSCOPIC;  Surgeon: Andrew Mckeon MD;  Location: [facility] OR;  Service: OB/GYN;  Laterality: N/A;    HYSTEROSCOPY WITH DILATION AND CURETTAGE OF UTERUS N/A 12/16/2020    Procedure: HYSTEROSCOPY, WITH DILATION AND CURETTAGE OF UTERUS;  Surgeon: Andrew Mckeon MD;  Location: [facility] OR;  Service: OB/GYN;  Laterality: N/A;     Family History   Problem Relation Age of Onset    Heart failure Father     Hyperlipidemia Father     Hypertension Father     Prostate cancer Father     Heart murmur Mother     Hyperlipidemia Mother     Hypertension Mother      Social History     Socioeconomic History    Marital status:    Tobacco Use    Smoking status: Never    Smokeless tobacco: Never   Substance and Sexual Activity    Alcohol use: No    Drug use: No    Sexual activity: Yes     Partners: Male     Birth control/protection: Post-menopausal   Social History Narrative    ADVANCED MD PLANS        [facility]     Annual    Hypothyroidism    Uterine Fibroids        Visit Summary    Pap done    Mammo @ [facility]    Colonscopy up to date    Labs done with Dr Vargas    Healthy diet and exercise discussed    TVUS today: reassuring    Flu shot        Return for follow up appointment in one year or prn.     GYN Exam (Annual/6Wk PP)10  [facility]11/19/2018     Annual exam.  Hypothyroidism.  Chronic/recurrent UTIs.  Uterine fibroids.    Mammogram at [facility].    PCP: Dr. Vargas.    Urine for culture and sensitivity.    Consult Dr. Cleaning secondary to recurrent UTIs.    Visit Summary    Ordered:    VAG U/S NON OB     GYN Visit & [id number] [facility]1/23/2018     Chronic urinary tract infection    Lower back pain        Visit Summary    Ordered:    Urine Culture, Routine    If culture positive, refer to urology.    Pt just finished antibiotics x one month.    Pt has had three positive urine cultures in the past 3-4 months.        Return for follow up appointment prn/annual.     GYN Visit & [id number] [facility]12/6/2017     Urinary Tract Infection    Ear pain    Allergic Rhinitis        Visit Summary    Pt has Benadryl Sinus at home to take    Ordered:    UA w/o Micro for culture        Return for follow up appointment prn/annual.     GYN Visit & [id number] [facility]11/13/2017     Urinary Tract Infection    Recently diagnosed with Hypothyroidism        Visit Summary    Repeat Urine Culture today    Pt was asymptomatic    Follow up with Dr Vargas as scheduled.    Follow up with Cardiologist as scheduled        Return for follow up appointment prn/annual.     GYN Exam (Annual/6Wk PP)10 [facility]10/16/2017     Annual exam.   right lower quadrant pain.  Uterine fibroids.    Mammogram at [facility].    Transvaginal ultrasound today.        Visit Summary    Ordered:    Urine Culture, Routine    Pap IG, rfx HPV ASCU     GYN Visit & [id number] [facility]1/10/2017     Pelvic Pain: resolved    R Flank pain resolved        Visit Summary    U/S reviewed, all wnl.    Discussed could have been an urinary or bowel issue.    Pt states the pain has gone now.        Return for follow up appointment for annual or prn.    25 minutes face to face time spent with patient with greater than 50% of time spent counseling.     GYN Exam (Annual/6Wk PP)10 [facility]9/26/2016     Annual exam..  uterine fibroid.    Mammogram at [facility].    Health maintenance information given to patient.    influenza vaccination given today.     GYN Annual Exam/6wk PP Exam9/24/2015     Annual exam.  Thickened endometrial stripe.  Cystorectocele.  Influenza vaccine.    Mammogram at [facility].    Flu shot.    Health maintenance information given to patient.    Will expectantly manage the episodes of postmenopausal bleeding for months ago.     GYN Visit & F/U6/26/2015     Postmenopausal bleeding.  Thickened endometrial stripe.  Obesity.    At this time we'll proceed with expectant management patient is to return to clinic in 3 months for annual exam and transvaginal ultrasound to check endometrial stripe.  Patient was instructed that if she had any episodes of postmenopausal bleeding she would return to clinic in we will proceed with D&C hysteroscopy at [facility]     GYN Visit & F/U6/11/2015     Thickened endometrial Stripe    hx of Post menopausal bleed    S/P Gastric Sleeve surgery        Plan: Return for follow up appointment in 3-4 weeks with Dr Mckeon to discuss further recommendations of possible D&C or expectant management or sooner if needed.    Follow up with surgeon as scheduled.     GYN Visit & F/U5/28/2015     Postmenopausal bleeding.  Thickened endometrial stripe.    FSH today.    Endometrial biopsy and transvaginal ultrasound performed today.     GYN Visit & F/U3/5/2015     Obesity        Plan: watch diet    Increase exercise    Decrease stress.    Follow up with Dr Tyson as scheduled.     GYN Visit & F/U2/5/2015     Obesity        Plan: increase exercise    Follow diet as discussed, low carb.    Return for follow up appointment one month for last monthly weight check.     GYN Visit & F/U1/6/2015     Obesity        Plan: Contiue Atkins diet    Start exercise, 30 minutes 4x weekly.    Continue CPAP as directed.    Return for follow up appointment one month.    Pt to see psych  for evaluation and PCP for medical clearance.     GYN Visit & F/U12/1/2014     Obesity        Plan: Continue Atkins low carb diet, lean protein.    Exercise discussed.    Continue CPAP as directed.    Return for follow up appointment one month for weight check.     GYN Visit & F/U8/18/2014     Depression.  Obesity.  Hypercholesterolemia.  Sleep apnea.    Consult Dr. vergara for possible gastric sleeve.    Adipex 37.5 #30.    Continue Wellbutrin 150 SR daily.    Return to clinic in one month for phentermine prescription a patient desires.     GYN Exam (Annual/6Wk PP)107/22/2014     Annual exam.  Obesity.  Depression.  Atrophic vaginitis.    Mammogram and bone mineral density at [facility].    Information about the help including calcium, vitamin D and weightbearing exercises discussed with patient.    Detailed discussion with patient for approximately 25 min. with ways to improve activity including purchasing a fit band carb counts  including Atkins diet..  And exercise.    Patient requests Adipex 37.5 mg 1 by mouth daily.    Wellbutrin 150 mg XR one by mouth daily.    Estrace 0.5 g per vagina biweekly.  Return to clinic in one month to  check signs and symptoms.    Patient has her blood work drawn by her primary care provider.     GYN Visit & F/U8/15/2013     Endometritis.  Adenomyosis.  Disordered proliferative endometrium.    Doxycycline and Flagyl x2 weeks.  If patient has another episode of postmenopausal bleeding recommend treatment for prevention of endometrial hyperplasia with Prometrium 100 mg by mouth daily at bedtime.  Discussed exercise and weight loss.     Endometrial Biopsy7/29/2013     PMB,Submucosal calcification.    Patient did not tolerate dilatation of the cervix and did not tolerate office hysteroscopy at all was unable to visualize the uterine cavity.  Patient was very uncomfortable with the procedure.    Consent signed and placed on chart.  Risks and benefits explained will proceed  with outpt D&C hscope on wednesday     GYN Visit & F/U7/11/2013     Submucosal lesion in the endometrium.  Perimenopausal.    FSH today.  If elevated patient needs office hysteroscopy performed and will need pre-procedure medications.     GYN Exam (Annual/6Wk PP)106/24/2013     Annual.  Low-back pain.  Enlarged uterus.  Hyperlipidemia.  Seizure disorder.  Obesity.    Lab work to be performed by primary care provider.    Mammogram and transvaginal ultrasound at [facility].    Over-the-counter black cohosh remedies.    Calcium 2000 mg daily.    Vitamin D 2000 units daily.    Exercise.    Will get urine to check for hematuria, had another visit.  If hematuria noted patient needs renal ultrasound.     GYN Exam (Annual/6Wk PP)106/19/2012     Send copy of recent labs to primary care physician.  Mammogram at [facility].  Consult Dr. Henderson for screening colonoscopy.  Increased fiber.  Discussed weightbearing exercises calcium and vitamin D.       Current Outpatient Medications   Medication Sig Dispense Refill    atorvastatin (LIPITOR) 80 MG tablet TAKE 1 TABLET EVERY EVENING AFTER SUPPER.      calcium carbonate (CALCIUM 300 ORAL) Take by mouth.      clotrimazole (LOTRIMIN) 1 % cream 2 (two) times daily.      cyanocobalamin (VITAMIN B-12) 1000 MCG tablet Take 100 mcg by mouth once daily.      famotidine (PEPCID) 20 MG tablet 20 mg.      fluticasone propionate (FLONASE) 50 mcg/actuation nasal spray       levothyroxine (SYNTHROID) 75 MCG tablet Take 75 mcg by mouth once daily.      loratadine (CLARITIN) 10 mg tablet Take 10 mg by mouth once daily.      meloxicam (MOBIC) 7.5 MG tablet TAKE 1 TABLET BY MOUTH 2 TIMES DAILY. 60 tablet 0    metoprolol succinate (TOPROL-XL) 25 MG 24 hr tablet Take 12.5 mg by mouth once daily.      multivitamin (THERAGRAN) per tablet Take 1 tablet by mouth once daily.      omeprazole (PRILOSEC) 40 MG capsule TAKE 1 CAPSULE BY MOUTH EVERY DAY 30 MINUTES BEFORE MORNING MEAL FOR  "90 DAYS      PHENobarbital (LUMINAL) 32.4 MG tablet   5    azelastine (ASTELIN) 137 mcg (0.1 %) nasal spray 2 sprays 2 (two) times daily.      doxycycline (VIBRAMYCIN) 100 MG Cap Take 100 mg by mouth 2 (two) times daily.      hydrocodone-acetaminophen (HYCET) solution 7.5-325 mg/15mL SMARTSI-2 Teaspoon By Mouth Every 4-6 Hours PRN      pseudoephedrine (SUDAFED) 30 MG tablet Take 30 mg by mouth 3 (three) times daily.       No current facility-administered medications for this visit.     Review of patient's allergies indicates:   Allergen Reactions    Cephalexin     Pseudoephedrine-guaifenesin        Review of Systems   Musculoskeletal:  Positive for arthralgias.   All other systems reviewed and are negative.    Objective:      Vitals:    23 1158   BP: 128/78   Pulse: 70   Weight: 95.4 kg (210 lb 5.1 oz)   Height: 5' 6" (1.676 m)     Physical Exam  Vitals and nursing note reviewed.   Constitutional:       Appearance: Normal appearance.   Cardiovascular:      Pulses:           Dorsalis pedis pulses are 1+ on the right side and 1+ on the left side.        Posterior tibial pulses are 1+ on the right side and 1+ on the left side.   Pulmonary:      Effort: Pulmonary effort is normal.   Musculoskeletal:         General: Swelling and tenderness present.        Feet:    Feet:      Right foot:      Protective Sensation: 2 sites tested.  2 sites sensed.      Skin integrity: Erythema and warmth present.      Left foot:      Protective Sensation: 2 sites tested.  2 sites sensed.   Skin:     Capillary Refill: Capillary refill takes 2 to 3 seconds.      Findings: Erythema present.   Neurological:      General: No focal deficit present.      Mental Status: She is alert.   Psychiatric:         Mood and Affect: Mood normal.         Behavior: Behavior normal.            Assessment:       1. Plantar fasciitis - Right Foot    2. Osteoarthritis of ankle and foot, unspecified laterality    3. Tarsal tunnel syndrome of right side  "         Plan:       Patient presents today for follow-up of right foot pain she states the rest of her foot is doing very good the arch no longer hurts now the pain is just in the right heel area.  Patient is tolerating the meloxicam 7.5 mg however she is not taking this twice a day as directed she is only taking it once a day because she states that she forgets and is frequently not taking it in the morning.  Patient advised she really needs to take this twice a day as directed at least for the next several weeks.  Patient states the heel pad is definitely helping her pain has been on and off she still has some burning sensation in the back of her right heel.  Patient has findings additionally consistent with tarsal tunnel in addition to her plantar fasciitis.  I did add additional blue arch padding to the plantar arch of the patient's right power step and advised her she needs to make sure she is using the appropriate support at all times she is wearing it more of a dress shoe and is only using an arch pads she is not using the power steps so I dispensed the patient a power step dress arch support so that she can put these in her dress shoes and they will fit appropriately I did not add extra padding at this time but this can be added down the road as necessary patient was advised once she starts using the power steps at all times of weight-bearing her pain should completely resolve over the next several weeks I plan to follow up with the patient as needed.  Total face-to-face time including discussion evaluation treatment discussion of treatment options treatment plan fitting the patient for new power step arch supports and dispensing these in addition to previously dispensed power step arch supports with additional padding applied today equaled 30 minutes.  This note was created using [employer] voice recognition software that occasionally misinterpreted phrases or words.

## 2023-06-27 ENCOUNTER — HOSPITAL ENCOUNTER (OUTPATIENT)
Dept: RADIOLOGY | Facility: HOSPITAL | Age: 63
Discharge: HOME | End: 2023-06-27
Attending: OTOLARYNGOLOGY
Payer: COMMERCIAL

## 2023-06-27 DIAGNOSIS — J32.9 CHRONIC SINUSITIS: ICD-10-CM

## 2023-07-05 ENCOUNTER — APPOINTMENT (OUTPATIENT)
Dept: LAB | Facility: HOSPITAL | Age: 63
End: 2023-07-05
Attending: OTOLARYNGOLOGY
Payer: COMMERCIAL

## 2023-07-05 DIAGNOSIS — J32.4 CHRONIC PANSINUSITIS: Primary | ICD-10-CM

## 2023-07-05 PROCEDURE — 87075 CULTR BACTERIA EXCEPT BLOOD: CPT | Performed by: OTOLARYNGOLOGY

## 2023-07-05 PROCEDURE — 87076 CULTURE ANAEROBE IDENT EACH: CPT | Performed by: OTOLARYNGOLOGY

## 2023-07-10 LAB — BACTERIA SPEC ANAEROBE CULT: NORMAL

## 2023-07-13 ENCOUNTER — HOSPITAL ENCOUNTER (OUTPATIENT)
Dept: RADIOLOGY | Facility: HOSPITAL | Age: 63
Discharge: HOME | End: 2023-07-13
Attending: SURGERY
Payer: COMMERCIAL

## 2023-07-13 DIAGNOSIS — R10.11 RUQ PAIN: ICD-10-CM

## 2023-07-13 DIAGNOSIS — R12 HEARTBURN: ICD-10-CM

## 2023-07-13 DIAGNOSIS — R11.2 NAUSEA WITH VOMITING: ICD-10-CM

## 2023-07-13 DIAGNOSIS — K21.00 REFLUX ESOPHAGITIS: ICD-10-CM

## 2023-07-13 DIAGNOSIS — K21.9 GERD (GASTROESOPHAGEAL REFLUX DISEASE): ICD-10-CM

## 2023-07-13 PROCEDURE — 74240 X-RAY XM UPR GI TRC 1CNTRST: CPT | Mod: 26,,, | Performed by: RADIOLOGY

## 2023-07-13 PROCEDURE — 25500020 PHARM REV CODE 255

## 2023-07-13 PROCEDURE — 76705 ECHO EXAM OF ABDOMEN: CPT | Mod: 26,,, | Performed by: RADIOLOGY

## 2023-07-13 PROCEDURE — 76705 US ABDOMEN LIMITED: ICD-10-PCS | Mod: 26,,, | Performed by: RADIOLOGY

## 2023-07-13 PROCEDURE — A9698 NON-RAD CONTRAST MATERIALNOC: HCPCS

## 2023-07-13 PROCEDURE — 74240 FL UPPER GI: ICD-10-PCS | Mod: 26,,, | Performed by: RADIOLOGY

## 2023-07-13 PROCEDURE — 76705 ECHO EXAM OF ABDOMEN: CPT | Mod: TC

## 2023-07-13 PROCEDURE — 74240 X-RAY XM UPR GI TRC 1CNTRST: CPT | Mod: TC

## 2023-07-13 RX ADMIN — BARIUM SULFATE 140 ML: 980 POWDER, FOR SUSPENSION ORAL at 10:07

## 2023-07-13 RX ADMIN — BARIUM SULFATE 355 ML: 0.6 SUSPENSION ORAL at 10:07

## 2023-07-18 ENCOUNTER — OFFICE VISIT (OUTPATIENT)
Dept: URGENT CARE | Facility: CLINIC | Age: 63
End: 2023-07-18
Payer: COMMERCIAL

## 2023-07-18 VITALS
OXYGEN SATURATION: 97 % | WEIGHT: 210 LBS | DIASTOLIC BLOOD PRESSURE: 76 MMHG | HEIGHT: 66 IN | HEART RATE: 72 BPM | BODY MASS INDEX: 33.75 KG/M2 | TEMPERATURE: 98 F | SYSTOLIC BLOOD PRESSURE: 112 MMHG

## 2023-07-18 DIAGNOSIS — M19.90 ARTHRITIS: Primary | ICD-10-CM

## 2023-07-18 PROCEDURE — 99203 PR OFFICE/OUTPT VISIT, NEW, LEVL III, 30-44 MIN: ICD-10-PCS | Mod: S$GLB,,, | Performed by: NURSE PRACTITIONER

## 2023-07-18 PROCEDURE — 99203 OFFICE O/P NEW LOW 30 MIN: CPT | Mod: S$GLB,,, | Performed by: NURSE PRACTITIONER

## 2023-07-18 RX ORDER — MELOXICAM 7.5 MG/1
7.5 TABLET ORAL DAILY
Qty: 5 TABLET | Refills: 0 | Status: SHIPPED | OUTPATIENT
Start: 2023-07-18 | End: 2023-07-23

## 2023-07-18 NOTE — PROGRESS NOTES
"Subjective:      Patient ID: Tess Pearson is a 62 y.o. female.    Vitals:  height is 5' 6" (1.676 m) and weight is 95.3 kg (210 lb). Her oral temperature is 97.7 °F (36.5 °C). Her blood pressure is 112/76 and her pulse is 72. Her oxygen saturation is 97%.     Chief Complaint: Hand Pain (Left hand pain x 2 days)    This is a 62 y.o. female who presents today with a chief complaint of  Patient presents with Left hand pain x 2 days. Patient denies injury. She explains that she has known arthritis in her right hand. She denies any fever.        Hand Pain   The incident occurred 2 days ago. The pain is present in the left hand. The quality of the pain is described as burning. The pain does not radiate. The pain is at a severity of 4/10. The pain is moderate. The pain has been Constant since the incident. Nothing aggravates the symptoms. She has tried nothing for the symptoms. The treatment provided no relief.     Musculoskeletal:  Positive for joint pain, joint swelling and arthritis.    Objective:     Physical Exam   Constitutional: She is oriented to person, place, and time. obesity  HENT:   Head: Normocephalic and atraumatic.   Mouth/Throat: Mucous membranes are moist. Oropharynx is clear.   Eyes: Conjunctivae are normal. Extraocular movement intact   Neck: Neck supple.   Cardiovascular: Normal rate, regular rhythm, normal heart sounds and normal pulses.   Pulmonary/Chest: Effort normal and breath sounds normal.   Abdominal: Normal appearance.   Musculoskeletal: Normal range of motion.         General: Swelling present. No tenderness, deformity or signs of injury. Normal range of motion.      Comments: Mild swelling about the left 3rd finger. There is no erythema. Patient has good TNV of the exremity.   Neurological: She is alert and oriented to person, place, and time.   Skin: Skin is warm and dry.   Psychiatric: Her behavior is normal. Mood normal.   Vitals reviewed.    Assessment:     1. Arthritis        Plan: "   Follow up with your PCP (Dr. Vargas) as directed. To ER if symptoms worsen, you develop any new symptoms, or your current symptoms fail to improve.    Arthritis          Medical Decision Making:   Differential Diagnosis:   GOUT

## 2023-08-05 ENCOUNTER — OFFICE VISIT (OUTPATIENT)
Dept: URGENT CARE | Facility: CLINIC | Age: 63
End: 2023-08-05
Payer: COMMERCIAL

## 2023-08-05 VITALS
SYSTOLIC BLOOD PRESSURE: 126 MMHG | OXYGEN SATURATION: 98 % | HEART RATE: 93 BPM | TEMPERATURE: 98 F | BODY MASS INDEX: 32.14 KG/M2 | WEIGHT: 200 LBS | DIASTOLIC BLOOD PRESSURE: 78 MMHG | HEIGHT: 66 IN

## 2023-08-05 DIAGNOSIS — M79.641 HAND PAIN, RIGHT: ICD-10-CM

## 2023-08-05 DIAGNOSIS — S60.221A CONTUSION OF RIGHT HAND, INITIAL ENCOUNTER: Primary | ICD-10-CM

## 2023-08-05 PROCEDURE — 99213 PR OFFICE/OUTPT VISIT, EST, LEVL III, 20-29 MIN: ICD-10-PCS | Mod: S$GLB,,, | Performed by: NURSE PRACTITIONER

## 2023-08-05 PROCEDURE — 99213 OFFICE O/P EST LOW 20 MIN: CPT | Mod: S$GLB,,, | Performed by: NURSE PRACTITIONER

## 2023-08-05 NOTE — PROGRESS NOTES
"Subjective:       Patient ID: Tess Pearson is a 62 y.o. female.    Vitals:  height is 5' 6" (1.676 m) and weight is 90.7 kg (200 lb). Her oral temperature is 98.3 °F (36.8 °C). Her blood pressure is 126/78 and her pulse is 93. Her oxygen saturation is 98%.     Chief Complaint: Hand Injury (Rt hand injury yesterday)    This is a 62 y.o. female who presents today with a chief complaint of rt hand injury which occurred yesterday. She explains that she fell onto right hand 2 days ago.        Hand Injury   Her dominant hand is their right hand. The incident occurred 12 to 24 hours ago. The incident occurred at home. The injury mechanism was a fall. The pain is present in the right hand. The quality of the pain is described as aching. The pain does not radiate. The pain is at a severity of 6/10. The pain is moderate. The pain has been Constant since the incident. The symptoms are aggravated by movement. She has tried nothing for the symptoms.       Musculoskeletal:  Positive for pain.   Skin:  Positive for bruising.           Objective:      Physical Exam   Constitutional: She is oriented to person, place, and time. obesity  HENT:   Head: Normocephalic and atraumatic.   Eyes: Conjunctivae are normal. Extraocular movement intact   Neck: Neck supple.   Cardiovascular: Normal rate, regular rhythm, normal heart sounds and normal pulses.   Pulmonary/Chest: Effort normal and breath sounds normal.   Abdominal: Normal appearance.   Musculoskeletal: Normal range of motion.         General: Normal range of motion.      Comments: Mild TTP dorsal aspect of right hand.   Neurological: She is alert and oriented to person, place, and time.   Skin: Skin is warm and dry.         Comments: Mild bruising to dorsal aspect of right hand.   Psychiatric: Her behavior is normal. Mood normal.   Vitals reviewed.        Past medical history and current medications reviewed.       Assessment:           1. Contusion of right hand, initial encounter  "   2. Hand pain, right              Plan:         Contusion of right hand, initial encounter    Hand pain, right  -     XR HAND COMPLETE 3 VIEW RIGHT; Future; Expected date: 08/05/2023             There are no Patient Instructions on file for this visit.           Medical Decision Making:   Differential Diagnosis:   Metacarpal fracture.

## 2023-09-05 NOTE — TELEPHONE ENCOUNTER
----- Message from Stanley Knight sent at 12/27/2018  9:08 AM CST -----  Contact: self   Patient want to speak with a nurse regarding medical questions please call back at  [phone]     General Sunscreen Counseling: I recommended a broad spectrum sunscreen with a SPF of 30 or higher.  I explained that SPF 30 sunscreens block approximately 97 percent of the sun's harmful rays.  Sunscreens should be applied at least 15 minutes prior to expected sun exposure and then every 2 hours after that as long as sun exposure continues. If swimming or exercising sunscreen should be reapplied every 45 minutes to an hour after getting wet or sweating.  One ounce, or the equivalent of a shot glass full of sunscreen, is adequate to protect the skin not covered by a bathing suit. I also recommended a lip balm with a sunscreen as well. Sun protective clothing can be used in lieu of sunscreen but must be worn the entire time you are exposed to the sun's rays. Detail Level: Generalized

## 2024-04-04 ENCOUNTER — HOSPITAL ENCOUNTER (OUTPATIENT)
Dept: RADIOLOGY | Facility: HOSPITAL | Age: 64
Discharge: HOME | End: 2024-04-04
Attending: SURGERY
Payer: COMMERCIAL

## 2024-04-04 DIAGNOSIS — R13.10 DYSPHAGIA: ICD-10-CM

## 2024-04-04 DIAGNOSIS — K21.9 ACID REFLUX: ICD-10-CM

## 2024-04-04 DIAGNOSIS — R11.2 NAUSEA WITH VOMITING: ICD-10-CM

## 2024-04-04 PROCEDURE — 74240 X-RAY XM UPR GI TRC 1CNTRST: CPT | Mod: TC

## 2024-04-04 PROCEDURE — 25500020 PHARM REV CODE 255

## 2024-04-04 PROCEDURE — 74240 X-RAY XM UPR GI TRC 1CNTRST: CPT | Mod: 26,,, | Performed by: RADIOLOGY

## 2024-04-04 PROCEDURE — A9698 NON-RAD CONTRAST MATERIALNOC: HCPCS

## 2024-04-04 RX ADMIN — BARIUM SULFATE 70 ML: 980 POWDER, FOR SUSPENSION ORAL at 09:04

## 2024-04-04 RX ADMIN — BARIUM SULFATE 150 ML: 0.6 SUSPENSION ORAL at 09:04

## 2024-05-28 ENCOUNTER — HOSPITAL ENCOUNTER (OUTPATIENT)
Dept: RADIOLOGY | Facility: HOSPITAL | Age: 64
Discharge: HOME | End: 2024-05-28
Attending: PODIATRIST
Payer: COMMERCIAL

## 2024-05-28 ENCOUNTER — TELEPHONE (OUTPATIENT)
Dept: PODIATRY | Facility: CLINIC | Age: 64
End: 2024-05-28
Payer: COMMERCIAL

## 2024-05-28 ENCOUNTER — OFFICE VISIT (OUTPATIENT)
Dept: PODIATRY | Facility: CLINIC | Age: 64
End: 2024-05-28
Payer: COMMERCIAL

## 2024-05-28 VITALS
HEIGHT: 66 IN | SYSTOLIC BLOOD PRESSURE: 101 MMHG | DIASTOLIC BLOOD PRESSURE: 70 MMHG | BODY MASS INDEX: 34.39 KG/M2 | WEIGHT: 214 LBS | HEART RATE: 72 BPM

## 2024-05-28 DIAGNOSIS — M79.671 FOOT PAIN, RIGHT: Primary | ICD-10-CM

## 2024-05-28 DIAGNOSIS — M79.671 FOOT PAIN, RIGHT: ICD-10-CM

## 2024-05-28 DIAGNOSIS — M76.829 TIBIALIS POSTERIOR TENDINITIS, UNSPECIFIED LATERALITY: ICD-10-CM

## 2024-05-28 DIAGNOSIS — M72.2 PLANTAR FASCIITIS: ICD-10-CM

## 2024-05-28 PROCEDURE — 73630 X-RAY EXAM OF FOOT: CPT | Mod: 26,RT,, | Performed by: RADIOLOGY

## 2024-05-28 PROCEDURE — 3078F DIAST BP <80 MM HG: CPT | Mod: CPTII,S$GLB,, | Performed by: PODIATRIST

## 2024-05-28 PROCEDURE — 1160F RVW MEDS BY RX/DR IN RCRD: CPT | Mod: CPTII,S$GLB,, | Performed by: PODIATRIST

## 2024-05-28 PROCEDURE — 3008F BODY MASS INDEX DOCD: CPT | Mod: CPTII,S$GLB,, | Performed by: PODIATRIST

## 2024-05-28 PROCEDURE — 1159F MED LIST DOCD IN RCRD: CPT | Mod: CPTII,S$GLB,, | Performed by: PODIATRIST

## 2024-05-28 PROCEDURE — 99999 PR PBB SHADOW E&M-EST. PATIENT-LVL IV: CPT | Mod: PBBFAC,,, | Performed by: PODIATRIST

## 2024-05-28 PROCEDURE — 73630 X-RAY EXAM OF FOOT: CPT | Mod: TC,PN,RT

## 2024-05-28 PROCEDURE — 3074F SYST BP LT 130 MM HG: CPT | Mod: CPTII,S$GLB,, | Performed by: PODIATRIST

## 2024-05-28 PROCEDURE — 99214 OFFICE O/P EST MOD 30 MIN: CPT | Mod: S$GLB,,, | Performed by: PODIATRIST

## 2024-05-28 RX ORDER — NORTRIPTYLINE HYDROCHLORIDE 10 MG/1
10 CAPSULE ORAL NIGHTLY
COMMUNITY
Start: 2024-03-17

## 2024-05-28 NOTE — TELEPHONE ENCOUNTER
----- Message from Tay Smalls DPM sent at 5/28/2024  1:55 PM CDT -----    Please call and advised the patient I did review her x-rays she does have some osteoporotic bone or loss of bone density however that would not be causing her discomfort there is a spur on the bottom of her heel however again this is not causing her discomfort her discomfort is related to the plantar fascia in the arch area I will review her x-ray with her at follow-up.  ----- Message -----  From: Interface, Rad Results In  Sent: 5/28/2024   1:18 PM CDT  To: Tay Smalls DPM

## 2024-05-28 NOTE — TELEPHONE ENCOUNTER
----- Message from Tay Smalls DPM sent at 5/28/2024  1:55 PM CDT -----    Please call and advised the patient I did review her x-rays she does have some osteoporotic bone or loss of bone density however that would not be causing her discomfort there is a spur on the bottom of her heel however again this is not causing her discomfort her discomfort is related to the plantar fascia in the arch area I will review her x-ray with her at follow-up.  ----- Message -----  From: Interface, Rad Results In  Sent: 5/28/2024   1:18 PM CDT  To: Tay Smalls DPM  
Statement Selected

## 2024-05-29 PROBLEM — M76.829 TIBIALIS POSTERIOR TENDINITIS: Status: ACTIVE | Noted: 2024-05-29

## 2024-05-29 NOTE — PROGRESS NOTES
Subjective:       Patient ID: Tess Pearson is a 63 y.o. female.    Chief Complaint: Foot Pain (both) and Ankle Pain (Right foot)  Patient presents today for follow-up of right foot pain.    Past Medical History:   Diagnosis Date    Acid reflux     Epilepsy     Hyperlipidemia     Thyroid disease      Past Surgical History:   Procedure Laterality Date    COLONOSCOPY N/A 4/21/2022    Procedure: COLONOSCOPY;  Surgeon: Diane Higuera MD;  Location: [facility] ENDO;  Service: General;  Laterality: N/A;    COLONOSCOPY W/ BIOPSIES  2016    [facility]    ENDOMETRIAL ABLATION      gastric sleeve      HYSTEROSCOPIC POLYPECTOMY OF UTERUS N/A 12/16/2020    Procedure: POLYPECTOMY, UTERUS, HYSTEROSCOPIC;  Surgeon: Andrew Mckeon MD;  Location: [facility] OR;  Service: OB/GYN;  Laterality: N/A;    HYSTEROSCOPY WITH DILATION AND CURETTAGE OF UTERUS N/A 12/16/2020    Procedure: HYSTEROSCOPY, WITH DILATION AND CURETTAGE OF UTERUS;  Surgeon: Andrew Mckeon MD;  Location: [facility] OR;  Service: OB/GYN;  Laterality: N/A;     Family History   Problem Relation Name Age of Onset    Heart failure Father      Hyperlipidemia Father      Hypertension Father      Prostate cancer Father      Heart murmur Mother      Hyperlipidemia Mother      Hypertension Mother       Social History     Socioeconomic History    Marital status:    Tobacco Use    Smoking status: Never     Passive exposure: Current    Smokeless tobacco: Never   Substance and Sexual Activity    Alcohol use: No    Drug use: No    Sexual activity: Yes     Partners: Male     Birth control/protection: Post-menopausal   Social History Narrative    ADVANCED MD PLANS        GYN Ex[facility] (Annual/6Wk PP[facility]     Annual    Hypothyroidism    Uterine Fibroids        Visit Summary    Pap done    Mammo @ [facility]    Colonscopy up to date    Labs done with Dr Vargas    Healthy diet and exercise discussed    TVUS today: reassuring    Flu shot        Return for follow up appointment in one year  or prn.     GYN Exam (Annual/6Wk PP)10 [facility]/19/2018     Annual exam.  Hypothyroidism.  Chronic/recurrent UTIs.  Uterine fibroids.    Mammogram at [facility].    PCP: Dr. Vargas.    Urine for culture and sensitivity.    Consult Dr. Cleaning secondary to recurrent UTIs.    Visit Summary    Ordered:    VAG U/S NON OB     GYN Visit & [id number] [facility]1/23/2018     Chronic urinary tract infection    Lower back pain        Visit Summary    Ordered:    Urine Culture, Routine    If culture positive, refer to urology.    Pt just finished antibiotics x one month.    Pt has had three positive urine cultures in the past 3-4 months.        Return for follow up appointment prn/annual.     GYN Visit & [id number] [facility]12/6/2017     Urinary Tract Infection    Ear pain    Allergic Rhinitis        Visit Summary    Pt has Benadryl Sinus at home to take    Ordered:    UA w/o Micro for culture        Return for follow up appointment prn/annual.     GYN Visit & [id number] [facility]11/13/2017     Urinary Tract Infection    Recently diagnosed with Hypothyroidism        Visit Summary    Repeat Urine Culture today    Pt was asymptomatic    Follow up with Dr Vargas as scheduled.    Follow up with Cardiologist as scheduled        Return for follow up appointment prn/annual.     GYN Exam (Annual/6Wk PP)10 [facility]10/16/2017     Annual exam.   right lower quadrant pain.  Uterine fibroids.    Mammogram at [facility].    Transvaginal ultrasound today.        Visit Summary    Ordered:    Urine Culture, Routine    Pap IG, rfx HPV ASCU     GYN Visit & [id number] [facility]1/10/2017     Pelvic Pain: resolved    R Flank pain resolved        Visit Summary    U/S reviewed, all wnl.    Discussed could have been an urinary or bowel issue.    Pt states the pain has gone now.        Return for follow up appointment for annual or prn.    25 minutes face to face time spent with patient with greater than 50% of time spent counseling.     GYN Exam (Annual/6Wk  PP)10 [facility]9/26/2016     Annual exam.. uterine fibroid.    Mammogram at [facility].    Health maintenance information given to patient.    influenza vaccination given today.     GYN Annual Exam/6wk PP Exam9/24/2015     Annual exam.  Thickened endometrial stripe.  Cystorectocele.  Influenza vaccine.    Mammogram at [facility].    Flu shot.    Health maintenance information given to patient.    Will expectantly manage the episodes of postmenopausal bleeding for months ago.     GYN Visit & F/U6/26/2015     Postmenopausal bleeding.  Thickened endometrial stripe.  Obesity.    At this time we'll proceed with expectant management patient is to return to clinic in 3 months for annual exam and transvaginal ultrasound to check endometrial stripe.  Patient was instructed that if she had any episodes of postmenopausal bleeding she would return to clinic in we will proceed with D&C hysteroscopy at [facility]     GYN Visit & F/U6/11/2015     Thickened endometrial Stripe    hx of Post menopausal bleed    S/P Gastric Sleeve surgery        Plan: Return for follow up appointment in 3-4 weeks with Dr Mckeon to discuss further recommendations of possible D&C or expectant management or sooner if needed.    Follow up with surgeon as scheduled.     GYN Visit & F/U5/28/2015     Postmenopausal bleeding.  Thickened endometrial stripe.    FSH today.    Endometrial biopsy and transvaginal ultrasound performed today.     GYN Visit & F/U3/5/2015     Obesity        Plan: watch diet    Increase exercise    Decrease stress.    Follow up with Dr Tyson as scheduled.     GYN Visit & F/U2/5/2015     Obesity        Plan: increase exercise    Follow diet as discussed, low carb.    Return for follow up appointment one month for last monthly weight check.     GYN Visit & F/U1/6/2015     Obesity        Plan: Contiue Atkins diet    Start exercise, 30 minutes 4x weekly.    Continue CPAP as directed.    Return for follow up  appointment one month.    Pt to see psych for evaluation and PCP for medical clearance.     GYN Visit & F/U12/1/2014     Obesity        Plan: Continue Atkins low carb diet, lean protein.    Exercise discussed.    Continue CPAP as directed.    Return for follow up appointment one month for weight check.     GYN Visit & F/U8/18/2014     Depression.  Obesity.  Hypercholesterolemia.  Sleep apnea.    Consult Dr. vergara for possible gastric sleeve.    Adipex 37.5 #30.    Continue Wellbutrin 150 SR daily.    Return to clinic in one month for phentermine prescription a patient desires.     GYN Exam (Annual/6Wk PP)107/22/2014     Annual exam.  Obesity.  Depression.  Atrophic vaginitis.    Mammogram and bone mineral density at [facility].    Information about the help including calcium, vitamin D and weightbearing exercises discussed with patient.    Detailed discussion with patient for approximately 25 min. with ways to improve activity including purchasing a fit band carb counts  including Atkins diet..  And exercise.    Patient requests Adipex 37.5 mg 1 by mouth daily.    Wellbutrin 150 mg XR one by mouth daily.    Estrace 0.5 g per vagina biweekly.  Return to clinic in one month to  check signs and symptoms.    Patient has her blood work drawn by her primary care provider.     GYN Visit & F/U8/15/2013     Endometritis.  Adenomyosis.  Disordered proliferative endometrium.    Doxycycline and Flagyl x2 weeks.  If patient has another episode of postmenopausal bleeding recommend treatment for prevention of endometrial hyperplasia with Prometrium 100 mg by mouth daily at bedtime.  Discussed exercise and weight loss.     Endometrial Biopsy7/29/2013     PMB,Submucosal calcification.    Patient did not tolerate dilatation of the cervix and did not tolerate office hysteroscopy at all was unable to visualize the uterine cavity.  Patient was very uncomfortable with the procedure.    Consent signed and placed on chart.   Risks and benefits explained will proceed with outpt D&C hscope on wednesday     GYN Visit & F/U7/11/2013     Submucosal lesion in the endometrium.  Perimenopausal.    FSH today.  If elevated patient needs office hysteroscopy performed and will need pre-procedure medications.     GYN Exam (Annual/6Wk PP)106/24/2013     Annual.  Low-back pain.  Enlarged uterus.  Hyperlipidemia.  Seizure disorder.  Obesity.    Lab work to be performed by primary care provider.    Mammogram and transvaginal ultrasound at [facility].    Over-the-counter black cohosh remedies.    Calcium 2000 mg daily.    Vitamin D 2000 units daily.    Exercise.    Will get urine to check for hematuria, had another visit.  If hematuria noted patient needs renal ultrasound.     GYN Exam (Annual/6Wk PP)106/19/2012     Send copy of recent labs to primary care physician.  Mammogram at [facility].  Consult Dr. Henderson for screening colonoscopy.  Increased fiber.  Discussed weightbearing exercises calcium and vitamin D.     Social Determinants of Health     Food Insecurity: No Food Insecurity (12/11/2020)    Hunger Vital Sign     Worried About Running Out of Food in the Last Year: Never true     Ran Out of Food in the Last Year: Never true   Transportation Needs: No Transportation Needs (12/11/2020)    PRAPARE - Transportation     Lack of Transportation (Medical): No     Lack of Transportation (Non-Medical): No   Stress: No Stress Concern Present (12/11/2020)    [employer] - Occupational Stress Questionnaire     Feeling of Stress : Only a little       Current Outpatient Medications   Medication Sig Dispense Refill    atorvastatin (LIPITOR) 80 MG tablet TAKE 1 TABLET EVERY EVENING AFTER SUPPER.      azelastine (ASTELIN) 137 mcg (0.1 %) nasal spray 2 sprays 2 (two) times daily.      calcium carbonate (CALCIUM 300 ORAL) Take by mouth.      clotrimazole (LOTRIMIN) 1 % cream 2 (two) times daily.       "cyanocobalamin (VITAMIN B-12) 1000 MCG tablet Take 100 mcg by mouth once daily.      famotidine (PEPCID) 20 MG tablet 20 mg.      fluticasone propionate (FLONASE) 50 mcg/actuation nasal spray       levothyroxine (SYNTHROID) 75 MCG tablet Take 75 mcg by mouth once daily.      metoprolol succinate (TOPROL-XL) 25 MG 24 hr tablet Take 12.5 mg by mouth once daily.      multivitamin (THERAGRAN) per tablet Take 1 tablet by mouth once daily.      nortriptyline (PAMELOR) 10 MG capsule Take 10 mg by mouth every evening. at bedtime      omeprazole (PRILOSEC) 40 MG capsule TAKE 1 CAPSULE BY MOUTH EVERY DAY 30 MINUTES BEFORE MORNING MEAL FOR 90 DAYS      PHENobarbital (LUMINAL) 32.4 MG tablet   5    pseudoephedrine (SUDAFED) 30 MG tablet Take 30 mg by mouth 3 (three) times daily.       No current facility-administered medications for this visit.     Review of patient's allergies indicates:   Allergen Reactions    Cephalexin     Pseudoephedrine-guaifenesin        Review of Systems   Musculoskeletal:  Positive for arthralgias.   All other systems reviewed and are negative.      Objective:      Vitals:    05/28/24 0930   BP: 101/70   BP Location: Right arm   Patient Position: Sitting   Pulse: 72   Weight: 97.1 kg (214 lb)   Height: 5' 6" (1.676 m)     Physical Exam  Vitals and nursing note reviewed.   Constitutional:       Appearance: Normal appearance.   Cardiovascular:      Pulses:           Dorsalis pedis pulses are 1+ on the right side and 1+ on the left side.        Posterior tibial pulses are 1+ on the right side and 1+ on the left side.   Pulmonary:      Effort: Pulmonary effort is normal.   Musculoskeletal:         General: Swelling and tenderness present.        Feet:    Feet:      Right foot:      Protective Sensation: 2 sites tested.  2 sites sensed.      Skin integrity: Erythema and warmth present.      Left foot:      Protective Sensation: 2 sites tested.  2 sites sensed.   Skin:     Capillary Refill: Capillary refill " takes 2 to 3 seconds.      Findings: Erythema present.   Neurological:      General: No focal deficit present.      Mental Status: She is alert.   Psychiatric:         Mood and Affect: Mood normal.         Behavior: Behavior normal.                                    Assessment:       1. Foot pain, right    2. Plantar fasciitis - Right Foot    3. Tibialis posterior tendinitis, unspecified laterality          Plan:       Patient presents today for follow-up of right foot pain.    Patient was last seen well over a year ago she states her heel is not bothering her it is the midfoot area on the right side that is causing her discomfort she states it started about 2 weeks ago it is on the bottom of her foot and the top of her midfoot she relates no trauma or injury.  On evaluation patient does have some mild fibroma formation along the course of the plantar fascia she is got tenderness of the plantar fascia in the midfoot there is also some discomfort noted along the course of the posterior tibial tendon.   Patient was wearing very flat slipper like shoes today with absolutely no support she is barefoot most of the time when she is in the house I advised against both of these things patient has to use appropriate support at all times I have previously discussed this with her the patient indicated she did try to use the power step dress shoe supports but it made her shoe too tight on the left side so she has only been wearing it on the right but then it caused pressure in the arch area.  As an alternative to a support in the patient's shoe I have recommended a compression sleeve this will give her better support it will help to address the swelling and inflammation in the right foot patient did not want any prescription anti-inflammatory she states that she will take over-the-counter anti-inflammatory she did not want to take Mobic as previously prescribed.  I did remind the patient she is supposed to be applying Vicks  vapor rub to her toenails twice a day every day especially the 2nd digit left which is thick clearly fungally infected.  Obviously patient is having some plantar fasciitis in the midfoot right she also has some posterior tibial tendinitis this is likely related to inappropriate support we will see how she does over the next several weeks with the compression sleeve I did order plain film x-rays today which displayed no signs of fracture no dislocation.  Patient does have findings consistent with degenerative arthritis of the midfoot. This note was created using [employer] voice recognition software that occasionally misinterpreted phrases or words.

## 2024-06-11 ENCOUNTER — OFFICE VISIT (OUTPATIENT)
Dept: PODIATRY | Facility: CLINIC | Age: 64
End: 2024-06-11
Payer: COMMERCIAL

## 2024-06-11 VITALS
BODY MASS INDEX: 34.4 KG/M2 | WEIGHT: 214.06 LBS | HEIGHT: 66 IN | DIASTOLIC BLOOD PRESSURE: 63 MMHG | SYSTOLIC BLOOD PRESSURE: 101 MMHG | HEART RATE: 69 BPM

## 2024-06-11 DIAGNOSIS — M79.671 FOOT PAIN, RIGHT: ICD-10-CM

## 2024-06-11 DIAGNOSIS — M72.2 PLANTAR FASCIITIS: Primary | ICD-10-CM

## 2024-06-11 PROCEDURE — 1160F RVW MEDS BY RX/DR IN RCRD: CPT | Mod: CPTII,S$GLB,, | Performed by: PODIATRIST

## 2024-06-11 PROCEDURE — 99999 PR PBB SHADOW E&M-EST. PATIENT-LVL IV: CPT | Mod: PBBFAC,,, | Performed by: PODIATRIST

## 2024-06-11 PROCEDURE — 1159F MED LIST DOCD IN RCRD: CPT | Mod: CPTII,S$GLB,, | Performed by: PODIATRIST

## 2024-06-11 PROCEDURE — 3008F BODY MASS INDEX DOCD: CPT | Mod: CPTII,S$GLB,, | Performed by: PODIATRIST

## 2024-06-11 PROCEDURE — 3074F SYST BP LT 130 MM HG: CPT | Mod: CPTII,S$GLB,, | Performed by: PODIATRIST

## 2024-06-11 PROCEDURE — 3078F DIAST BP <80 MM HG: CPT | Mod: CPTII,S$GLB,, | Performed by: PODIATRIST

## 2024-06-11 PROCEDURE — 99214 OFFICE O/P EST MOD 30 MIN: CPT | Mod: S$GLB,,, | Performed by: PODIATRIST

## 2024-06-15 NOTE — PROGRESS NOTES
Subjective:       Patient ID: Tess Pearson is a 63 y.o. female.    Chief Complaint: Foot Pain (Right foot) and Follow-up  Patient presents today for follow-up of right foot pain.    Past Medical History:   Diagnosis Date    Acid reflux     Epilepsy     Hyperlipidemia     Thyroid disease      Past Surgical History:   Procedure Laterality Date    COLONOSCOPY N/A 4/21/2022    Procedure: COLONOSCOPY;  Surgeon: Diane Higuera MD;  Location: [facility] ENDO;  Service: General;  Laterality: N/A;    COLONOSCOPY W/ BIOPSIES  2016    [facility]    ENDOMETRIAL ABLATION      gastric sleeve      HYSTEROSCOPIC POLYPECTOMY OF UTERUS N/A 12/16/2020    Procedure: POLYPECTOMY, UTERUS, HYSTEROSCOPIC;  Surgeon: Andrew Mckeon MD;  Location: [facility] OR;  Service: OB/GYN;  Laterality: N/A;    HYSTEROSCOPY WITH DILATION AND CURETTAGE OF UTERUS N/A 12/16/2020    Procedure: HYSTEROSCOPY, WITH DILATION AND CURETTAGE OF UTERUS;  Surgeon: Andrew Mckeon MD;  Location: [facility] OR;  Service: OB/GYN;  Laterality: N/A;     Family History   Problem Relation Name Age of Onset    Heart failure Father      Hyperlipidemia Father      Hypertension Father      Prostate cancer Father      Heart murmur Mother      Hyperlipidemia Mother      Hypertension Mother       Social History     Socioeconomic History    Marital status:    Tobacco Use    Smoking status: Never     Passive exposure: Current    Smokeless tobacco: Never   Substance and Sexual Activity    Alcohol use: No    Drug use: No    Sexual activity: Yes     Partners: Male     Birth control/protection: Post-menopausal   Social History Narrative    ADVANCED MD PLANS        [facility]     Annual    Hypothyroidism    Uterine Fibroids        Visit Summary    Pap done    Mammo @ [facility]    Colonscopy up to date    Labs done with Dr Vargas    Healthy diet and exercise discussed    TVUS today: reassuring    Flu shot        Return for follow up appointment in one year or prn.      GYN Exam (Annual/6Wk PP)10 [facility]11/19/2018     Annual exam.  Hypothyroidism.  Chronic/recurrent UTIs.  Uterine fibroids.    Mammogram at [facility].    PCP: Dr. Vargas.    Urine for culture and sensitivity.    Consult Dr. Cleaning secondary to recurrent UTIs.    Visit Summary    Ordered:    VAG U/S NON OB     GYN Visit & [id number] [facility]U1/23/2018     Chronic urinary tract infection    Lower back pain        Visit Summary    Ordered:    Urine Culture, Routine    If culture positive, refer to urology.    Pt just finished antibiotics x one month.    Pt has had three positive urine cultures in the past 3-4 months.        Return for follow up appointment prn/annual.     GYN Visit & [id number] [facility]U12/6/2017     Urinary Tract Infection    Ear pain    Allergic Rhinitis        Visit Summary    Pt has Benadryl Sinus at home to take    Ordered:    UA w/o Micro for culture        Return for follow up appointment prn/annual.     GYN Visit & [id number] [facility]U11/13/2017     Urinary Tract Infection    Recently diagnosed with Hypothyroidism        Visit Summary    Repeat Urine Culture today    Pt was asymptomatic    Follow up with Dr Vargas as scheduled.    Follow up with Cardiologist as scheduled        Return for follow up appointment prn/annual.     GYN Exam (Annual/6Wk PP)10 [facility]10/16/2017     Annual exam.   right lower quadrant pain.  Uterine fibroids.    Mammogram at [facility].    Transvaginal ultrasound today.        Visit Summary    Ordered:    Urine Culture, Routine    Pap IG, rfx HPV ASCU     GYN Visit & [id number] CHARU1/10/2017     Pelvic Pain: resolved    R Flank pain resolved        Visit Summary    U/S reviewed, all wnl.    Discussed could have been an urinary or bowel issue.    Pt states the pain has gone now.        Return for follow up appointment for annual or prn.    25 minutes face to face time spent with patient with greater than 50% of time spent counseling.     GYN Exam (Annual/6Wk PP)10  Kathy[facility]9/26/2016     Annual exam.. uterine fibroid.    Mammogram at [facility].    Health maintenance information given to patient.    influenza vaccination given today.     GYN Annual Exam/6wk PP Exam9/24/2015     Annual exam.  Thickened endometrial stripe.  Cystorectocele.  Influenza vaccine.    Mammogram at [facility].    Flu shot.    Health maintenance information given to patient.    Will expectantly manage the episodes of postmenopausal bleeding for months ago.     GYN Visit & F/U6/26/2015     Postmenopausal bleeding.  Thickened endometrial stripe.  Obesity.    At this time we'll proceed with expectant management patient is to return to clinic in 3 months for annual exam and transvaginal ultrasound to check endometrial stripe.  Patient was instructed that if she had any episodes of postmenopausal bleeding she would return to clinic in we will proceed with D&C hysteroscopy at [facility]     GYN Visit & F/U6/11/2015     Thickened endometrial Stripe    hx of Post menopausal bleed    S/P Gastric Sleeve surgery        Plan: Return for follow up appointment in 3-4 weeks with Dr Mckeon to discuss further recommendations of possible D&C or expectant management or sooner if needed.    Follow up with surgeon as scheduled.     GYN Visit & F/U5/28/2015     Postmenopausal bleeding.  Thickened endometrial stripe.    FSH today.    Endometrial biopsy and transvaginal ultrasound performed today.     GYN Visit & F/U3/5/2015     Obesity        Plan: watch diet    Increase exercise    Decrease stress.    Follow up with Dr Tyson as scheduled.     GYN Visit & F/U2/5/2015     Obesity        Plan: increase exercise    Follow diet as discussed, low carb.    Return for follow up appointment one month for last monthly weight check.     GYN Visit & F/U1/6/2015     Obesity        Plan: Contiue Atkins diet    Start exercise, 30 minutes 4x weekly.    Continue CPAP as directed.    Return for follow up  appointment one month.    Pt to see psych for evaluation and PCP for medical clearance.     GYN Visit & F/U12/1/2014     Obesity        Plan: Continue Atkins low carb diet, lean protein.    Exercise discussed.    Continue CPAP as directed.    Return for follow up appointment one month for weight check.     GYN Visit & F/U8/18/2014     Depression.  Obesity.  Hypercholesterolemia.  Sleep apnea.    Consult Dr. vergara for possible gastric sleeve.    Adipex 37.5 #30.    Continue Wellbutrin 150 SR daily.    Return to clinic in one month for phentermine prescription a patient desires.     GYN Exam (Annual/6Wk PP)107/22/2014     Annual exam.  Obesity.  Depression.  Atrophic vaginitis.    Mammogram and bone mineral density at [facility].    Information about the help including calcium, vitamin D and weightbearing exercises discussed with patient.    Detailed discussion with patient for approximately 25 min. with ways to improve activity including purchasing a fit band carb counts  including Atkins diet..  And exercise.    Patient requests Adipex 37.5 mg 1 by mouth daily.    Wellbutrin 150 mg XR one by mouth daily.    Estrace 0.5 g per vagina biweekly.  Return to clinic in one month to  check signs and symptoms.    Patient has her blood work drawn by her primary care provider.     GYN Visit & F/U8/15/2013     Endometritis.  Adenomyosis.  Disordered proliferative endometrium.    Doxycycline and Flagyl x2 weeks.  If patient has another episode of postmenopausal bleeding recommend treatment for prevention of endometrial hyperplasia with Prometrium 100 mg by mouth daily at bedtime.  Discussed exercise and weight loss.     Endometrial Biopsy7/29/2013     PMB,Submucosal calcification.    Patient did not tolerate dilatation of the cervix and did not tolerate office hysteroscopy at all was unable to visualize the uterine cavity.  Patient was very uncomfortable with the procedure.    Consent signed and placed on chart.   Risks and benefits explained will proceed with outpt D&C hscope on wednesday     GYN Visit & F/U7/11/2013     Submucosal lesion in the endometrium.  Perimenopausal.    FSH today.  If elevated patient needs office hysteroscopy performed and will need pre-procedure medications.     GYN Exam (Annual/6Wk PP)106/24/2013     Annual.  Low-back pain.  Enlarged uterus.  Hyperlipidemia.  Seizure disorder.  Obesity.    Lab work to be performed by primary care provider.    Mammogram and transvaginal ultrasound at [facility].    Over-the-counter black cohosh remedies.    Calcium 2000 mg daily.    Vitamin D 2000 units daily.    Exercise.    Will get urine to check for hematuria, had another visit.  If hematuria noted patient needs renal ultrasound.     GYN Exam (Annual/6Wk PP)106/19/2012     Send copy of recent labs to primary care physician.  Mammogram at [facility].  Consult Dr. Henderson for screening colonoscopy.  Increased fiber.  Discussed weightbearing exercises calcium and vitamin D.     Social Determinants of Health     Food Insecurity: No Food Insecurity (12/11/2020)    Hunger Vital Sign     Worried About Running Out of Food in the Last Year: Never true     Ran Out of Food in the Last Year: Never true   Transportation Needs: No Transportation Needs (12/11/2020)    PRAPARE - Transportation     Lack of Transportation (Medical): No     Lack of Transportation (Non-Medical): No   Stress: No Stress Concern Present (12/11/2020)    [employer] - Occupational Stress Questionnaire     Feeling of Stress : Only a little       Current Outpatient Medications   Medication Sig Dispense Refill    atorvastatin (LIPITOR) 80 MG tablet TAKE 1 TABLET EVERY EVENING AFTER SUPPER.      azelastine (ASTELIN) 137 mcg (0.1 %) nasal spray 2 sprays 2 (two) times daily.      calcium carbonate (CALCIUM 300 ORAL) Take by mouth.      clotrimazole (LOTRIMIN) 1 % cream 2 (two) times daily.       "cyanocobalamin (VITAMIN B-12) 1000 MCG tablet Take 100 mcg by mouth once daily.      famotidine (PEPCID) 20 MG tablet 20 mg.      fluticasone propionate (FLONASE) 50 mcg/actuation nasal spray       levothyroxine (SYNTHROID) 75 MCG tablet Take 75 mcg by mouth once daily.      metoprolol succinate (TOPROL-XL) 25 MG 24 hr tablet Take 12.5 mg by mouth once daily.      multivitamin (THERAGRAN) per tablet Take 1 tablet by mouth once daily.      nortriptyline (PAMELOR) 10 MG capsule Take 10 mg by mouth every evening. at bedtime      omeprazole (PRILOSEC) 40 MG capsule TAKE 1 CAPSULE BY MOUTH EVERY DAY 30 MINUTES BEFORE MORNING MEAL FOR 90 DAYS      PHENobarbital (LUMINAL) 32.4 MG tablet   5     No current facility-administered medications for this visit.     Review of patient's allergies indicates:   Allergen Reactions    Cephalexin     Pseudoephedrine-guaifenesin        Review of Systems   Musculoskeletal:  Positive for arthralgias.   All other systems reviewed and are negative.      Objective:      Vitals:    06/11/24 1044   BP: 101/63   BP Location: Right arm   Patient Position: Sitting   Pulse: 69   Weight: 97.1 kg (214 lb 1.1 oz)   Height: 5' 6" (1.676 m)     Physical Exam  Vitals and nursing note reviewed.   Constitutional:       Appearance: Normal appearance.   Cardiovascular:      Pulses:           Dorsalis pedis pulses are 1+ on the right side and 1+ on the left side.        Posterior tibial pulses are 1+ on the right side and 1+ on the left side.   Pulmonary:      Effort: Pulmonary effort is normal.   Musculoskeletal:         General: Swelling and tenderness present.        Feet:    Feet:      Right foot:      Protective Sensation: 2 sites tested.  2 sites sensed.      Skin integrity: Erythema and warmth present.      Left foot:      Protective Sensation: 2 sites tested.  2 sites sensed.   Skin:     Capillary Refill: Capillary refill takes 2 to 3 seconds.      Findings: Erythema present.   Neurological:      " General: No focal deficit present.      Mental Status: She is alert.   Psychiatric:         Mood and Affect: Mood normal.         Behavior: Behavior normal.                                                  Assessment:       1. Plantar fasciitis - Right Foot    2. Foot pain, right          Plan:       Patient presents today for follow-up of right foot pain.    Patient is wearing the compression sleeve she states they definitely help when she is walking barefoot however I explained to the patient she should never be barefoot she should be wearing good supportive shoes at all times I have also explained to her she needs to be using the power step arch supports at all times she has only been wearing 1 in her right shoe and that is only some of the shoe she wears it is important that she is using these in both shoes at all times I did add additional blue arch padding to the plantar arch of the right power step slim tech support and advised the patient the additional support should make a big difference and should help to eliminate the heel pain that she is currently having.  Patient again made aware she can not be barefoot has to use appropriate support at all times of weight-bearing in order for this to resolve completely.  This note was created using [employer] voice recognition software that occasionally misinterpreted phrases or words.

## 2024-11-03 ENCOUNTER — OFFICE VISIT (OUTPATIENT)
Dept: URGENT CARE | Facility: CLINIC | Age: 64
End: 2024-11-03
Payer: COMMERCIAL

## 2024-11-03 VITALS
DIASTOLIC BLOOD PRESSURE: 58 MMHG | OXYGEN SATURATION: 99 % | RESPIRATION RATE: 19 BRPM | HEIGHT: 66 IN | TEMPERATURE: 99 F | SYSTOLIC BLOOD PRESSURE: 97 MMHG | WEIGHT: 205 LBS | BODY MASS INDEX: 32.95 KG/M2 | HEART RATE: 85 BPM

## 2024-11-03 DIAGNOSIS — B96.89 ACUTE BACTERIAL SINUSITIS: Primary | ICD-10-CM

## 2024-11-03 DIAGNOSIS — R05.9 COUGH, UNSPECIFIED TYPE: ICD-10-CM

## 2024-11-03 DIAGNOSIS — R09.81 NASAL CONGESTION: ICD-10-CM

## 2024-11-03 DIAGNOSIS — J01.90 ACUTE BACTERIAL SINUSITIS: Primary | ICD-10-CM

## 2024-11-03 LAB
CTP QC/QA: YES
SARS-COV-2 AG RESP QL IA.RAPID: NEGATIVE

## 2024-11-03 PROCEDURE — 99213 OFFICE O/P EST LOW 20 MIN: CPT | Mod: S$GLB,,, | Performed by: NURSE PRACTITIONER

## 2024-11-03 PROCEDURE — 87811 SARS-COV-2 COVID19 W/OPTIC: CPT | Mod: QW,S$GLB,, | Performed by: NURSE PRACTITIONER

## 2024-11-03 RX ORDER — AMOXICILLIN 875 MG/1
875 TABLET, FILM COATED ORAL EVERY 12 HOURS
Qty: 20 TABLET | Refills: 0 | Status: SHIPPED | OUTPATIENT
Start: 2024-11-03 | End: 2024-11-13

## 2024-11-03 RX ORDER — PREDNISONE 20 MG/1
20 TABLET ORAL DAILY
Qty: 5 TABLET | Refills: 0 | Status: SHIPPED | OUTPATIENT
Start: 2024-11-03

## 2024-11-03 RX ORDER — PSYLLIUM HUSK 0.4 G
600 CAPSULE ORAL DAILY
COMMUNITY

## 2024-11-03 RX ORDER — ESOMEPRAZOLE MAGNESIUM 40 MG/1
40 CAPSULE, DELAYED RELEASE ORAL
COMMUNITY
Start: 2024-10-10

## 2024-11-03 RX ORDER — CHOLECALCIFEROL (VITAMIN D3) 25 MCG
1000 TABLET ORAL DAILY
COMMUNITY

## 2024-11-03 RX ORDER — VIT C/E/ZN/COPPR/LUTEIN/ZEAXAN 250MG-90MG
500 CAPSULE ORAL DAILY
COMMUNITY

## 2024-11-03 NOTE — PROGRESS NOTES
"Subjective:      Patient ID: Tess Pearson is a 63 y.o. female.    Vitals:  height is 5' 6" (1.676 m) and weight is 93 kg (205 lb). Her oral temperature is 98.5 °F (36.9 °C). Her blood pressure is 97/58 (abnormal) and her pulse is 85. Her respiration is 19 and oxygen saturation is 99%.     Chief Complaint: Sinus Problem    This is a 63 y.o. female who presents today with a chief complaint of nasal congestion.   Patient presents with a nasal congestion, sinus pain, headache, sore throat and cough. Pt reports symptoms on and off over the past 7-10 days but has acutely worsened over the past three days with increase in sinus pain nasal congestion and headache.     Sinus Problem  This is a new problem. The current episode started in the past 7 days. The problem has been gradually worsening since onset. There has been no fever. Her pain is at a severity of 0/10. Associated symptoms include chills, congestion, coughing, ear pain, headaches, a hoarse voice, shortness of breath, sinus pressure and a sore throat. Past treatments include nothing.       Constitution: Positive for chills.   HENT:  Positive for ear pain, congestion, sinus pressure and sore throat.    Respiratory:  Positive for cough and shortness of breath.    Neurological:  Positive for headaches.      Objective:     Physical Exam   Constitutional: She is oriented to person, place, and time. She appears well-developed. She is cooperative.  Non-toxic appearance. She does not appear ill. No distress.   HENT:   Head: Normocephalic and atraumatic.   Ears:   Right Ear: Hearing, tympanic membrane, external ear and ear canal normal.   Left Ear: Hearing, tympanic membrane, external ear and ear canal normal.   Nose: No mucosal edema, rhinorrhea or nasal deformity. No epistaxis. Right sinus exhibits maxillary sinus tenderness and frontal sinus tenderness. Left sinus exhibits maxillary sinus tenderness and frontal sinus tenderness.   Mouth/Throat: Uvula is midline, " oropharynx is clear and moist and mucous membranes are normal. No trismus in the jaw. Normal dentition. No uvula swelling. No oropharyngeal exudate, posterior oropharyngeal edema or posterior oropharyngeal erythema.   Eyes: Conjunctivae and lids are normal. No scleral icterus.   Neck: Trachea normal and phonation normal. Neck supple. No edema present. No erythema present. No neck rigidity present.   Cardiovascular: Normal rate, regular rhythm, normal heart sounds and normal pulses.   Pulmonary/Chest: Effort normal and breath sounds normal. No respiratory distress. She has no decreased breath sounds. She has no rhonchi.   Abdominal: Normal appearance.   Musculoskeletal: Normal range of motion.         General: No deformity. Normal range of motion.   Neurological: She is alert and oriented to person, place, and time. She exhibits normal muscle tone. Coordination normal.   Skin: Skin is warm, dry, intact, not diaphoretic and not pale.   Psychiatric: Her speech is normal and behavior is normal. Judgment and thought content normal.   Nursing note and vitals reviewed.    Results for orders placed or performed in visit on 11/03/24   SARS Coronavirus 2 Antigen, POCT Manual Read    Collection Time: 11/03/24 12:28 PM   Result Value Ref Range    SARS Coronavirus 2 Antigen Negative Negative     Acceptable Yes       Assessment:     1. Acute bacterial sinusitis    2. Cough, unspecified type    3. Nasal congestion        Plan:       Acute bacterial sinusitis  -     amoxicillin (AMOXIL) 875 MG tablet; Take 1 tablet (875 mg total) by mouth every 12 (twelve) hours. for 10 days  Dispense: 20 tablet; Refill: 0    Cough, unspecified type  -     SARS Coronavirus 2 Antigen, POCT Manual Read    Nasal congestion  -     predniSONE (DELTASONE) 20 MG tablet; Take 1 tablet (20 mg total) by mouth once daily.  Dispense: 5 tablet; Refill: 0          Patient Instructions   Please return here or go to the Emergency Department for any  concerns or worsening of condition.  Please drink plenty of fluids.  Please get plenty of rest.  If you were prescribed antibiotics, please take them to completion.  If you do not have Hypertension or any history of palpitations, it is ok to take over the counter Sudafed or Mucinex D or Allegra-D or Claritin-D or Zyrtec-D.  If you do take one of the above, it is ok to combine that with plain over the counter Mucinex or Allegra or Claritin or Zyrtec.  If for example you are taking Zyrtec -D, you can combine that with Mucinex, but not Mucinex-D.  If you are taking Mucinex-D, you can combine that with plain Allegra or Claritin or Zyrtec.   If you do have Hypertension or palpitations, it is safe to take Coricidin HBP for relief of sinus symptoms.  If not allergic, please take over the counter Tylenol (Acetaminophen) and/or Motrin (Ibuprofen) as directed for control of pain and/or fever.  Please follow up with your primary care doctor or specialist as needed.    If you  smoke, please stop smoking.        Sancho Knight, ANTONIAC

## 2025-05-17 ENCOUNTER — OFFICE VISIT (OUTPATIENT)
Dept: URGENT CARE | Facility: CLINIC | Age: 65
End: 2025-05-17
Payer: COMMERCIAL

## 2025-05-17 VITALS
WEIGHT: 210 LBS | RESPIRATION RATE: 18 BRPM | OXYGEN SATURATION: 97 % | HEIGHT: 66 IN | TEMPERATURE: 98 F | SYSTOLIC BLOOD PRESSURE: 113 MMHG | DIASTOLIC BLOOD PRESSURE: 75 MMHG | HEART RATE: 70 BPM | BODY MASS INDEX: 33.75 KG/M2

## 2025-05-17 DIAGNOSIS — R09.81 NASAL CONGESTION: ICD-10-CM

## 2025-05-17 DIAGNOSIS — J06.9 VIRAL URI: Primary | ICD-10-CM

## 2025-05-17 DIAGNOSIS — J02.9 SORE THROAT: ICD-10-CM

## 2025-05-17 LAB
CTP QC/QA: YES
CTP QC/QA: YES
MOLECULAR STREP A: NEGATIVE
SARS-COV+SARS-COV-2 AG RESP QL IA.RAPID: NEGATIVE

## 2025-05-17 PROCEDURE — 99213 OFFICE O/P EST LOW 20 MIN: CPT | Mod: S$GLB,,, | Performed by: NURSE PRACTITIONER

## 2025-05-17 PROCEDURE — 87651 STREP A DNA AMP PROBE: CPT | Mod: QW,,, | Performed by: NURSE PRACTITIONER

## 2025-05-17 PROCEDURE — 87811 SARS-COV-2 COVID19 W/OPTIC: CPT | Mod: QW,S$GLB,, | Performed by: NURSE PRACTITIONER

## 2025-05-17 RX ORDER — POLYDEXTROSE 2.5 G
TABLET,CHEWABLE ORAL
COMMUNITY

## 2025-05-17 RX ORDER — FAMOTIDINE 40 MG/1
40 TABLET, FILM COATED ORAL NIGHTLY
COMMUNITY

## 2025-05-17 RX ORDER — FERROUS SULFATE, DRIED 159(45)MG
TABLET, EXTENDED RELEASE ORAL
COMMUNITY

## 2025-05-17 RX ORDER — CYCLOSPORINE 0.5 MG/ML
1 EMULSION OPHTHALMIC 2 TIMES DAILY
COMMUNITY
Start: 2025-01-10

## 2025-05-17 RX ORDER — MONTELUKAST SODIUM 10 MG/1
10 TABLET ORAL EVERY MORNING
COMMUNITY
Start: 2025-03-06

## 2025-05-17 NOTE — PROGRESS NOTES
"Subjective:       Patient ID: Tess Pearson is a 64 y.o. female.    Vitals:  height is 5' 6" (1.676 m) and weight is 95.3 kg (210 lb). Her oral temperature is 97.7 °F (36.5 °C). Her blood pressure is 113/75 and her pulse is 70. Her respiration is 18 and oxygen saturation is 97%.     Chief Complaint: Headache    This is a 64 y.o. female who presents today with a chief complaint of headache that started yesterday morning.  Today she started with a cough and is also experiencing mild hoarseness.  She reports mild irritation to the throat but no significant pain..  Taking tylenol and singulair with no relief. Declines testing at this time.       Patient presents with:  Headache         Headache   This is a new problem. The current episode started yesterday. The problem has been gradually worsening. The pain is located in the Frontal region. Quality: pressure. The pain is at a severity of 3/10. The pain is mild. Associated symptoms include coughing and sinus pressure. She has tried acetaminophen (singulair) for the symptoms. The treatment provided no relief.       Constitution: Negative.   HENT:  Positive for congestion and sinus pressure.    Respiratory:  Positive for cough. Negative for shortness of breath.    Neurological:  Positive for headaches.           Objective:      Physical Exam   Constitutional: She is oriented to person, place, and time. She appears well-developed. She is cooperative.  Non-toxic appearance. She does not appear ill. No distress.   HENT:   Head: Normocephalic and atraumatic.   Ears:   Right Ear: Hearing, tympanic membrane, external ear and ear canal normal.   Left Ear: Hearing, tympanic membrane, external ear and ear canal normal.   Nose: Nose normal. No mucosal edema, rhinorrhea or nasal deformity. No epistaxis. Right sinus exhibits no maxillary sinus tenderness and no frontal sinus tenderness. Left sinus exhibits no maxillary sinus tenderness and no frontal sinus tenderness.   Mouth/Throat: " Uvula is midline, oropharynx is clear and moist and mucous membranes are normal. No trismus in the jaw. Normal dentition. No uvula swelling. Cobblestoning present. No oropharyngeal exudate, posterior oropharyngeal edema or posterior oropharyngeal erythema.   Eyes: Conjunctivae and lids are normal. No scleral icterus.   Neck: Trachea normal and phonation normal. Neck supple. No edema present. No erythema present. No neck rigidity present.   Cardiovascular: Normal rate, regular rhythm, normal heart sounds and normal pulses.   Pulmonary/Chest: Effort normal and breath sounds normal. No respiratory distress. She has no decreased breath sounds. She has no rhonchi.   Abdominal: Normal appearance.   Musculoskeletal: Normal range of motion.         General: No deformity. Normal range of motion.   Neurological: She is alert and oriented to person, place, and time. She exhibits normal muscle tone. Coordination normal.   Skin: Skin is warm, dry, intact, not diaphoretic and not pale.   Psychiatric: Her speech is normal and behavior is normal. Judgment and thought content normal.   Nursing note and vitals reviewed.        Past medical history and current medications reviewed.     Results for orders placed or performed in visit on 05/17/25   SARS Coronavirus 2 Antigen, POCT Manual Read    Collection Time: 05/17/25  3:57 PM   Result Value Ref Range    SARS Coronavirus 2 Antigen Negative Negative, Presumptive Negative     Acceptable Yes    POCT Strep A, Molecular    Collection Time: 05/17/25  4:18 PM   Result Value Ref Range    Molecular Strep A, POC Negative Negative     Acceptable Yes       Assessment:           1. Viral URI    2. Nasal congestion    3. Sore throat              Plan:         Viral URI    Nasal congestion  -     SARS Coronavirus 2 Antigen, POCT Manual Read    Sore throat  -     POCT Strep A, Molecular             Patient Instructions   Please return here or go to the Emergency  Department for any concerns or worsening of condition.  Please drink plenty of fluids.  Please get plenty of rest.  If you were prescribed antibiotics, please take them to completion.  If you do not have Hypertension or any history of palpitations, it is ok to take over the counter Sudafed or Mucinex D or Allegra-D or Claritin-D or Zyrtec-D.  If you do take one of the above, it is ok to combine that with plain over the counter Mucinex or Allegra or Claritin or Zyrtec.  If for example you are taking Zyrtec -D, you can combine that with Mucinex, but not Mucinex-D.  If you are taking Mucinex-D, you can combine that with plain Allegra or Claritin or Zyrtec.   If you do have Hypertension or palpitations, it is safe to take Coricidin HBP for relief of sinus symptoms.  If not allergic, please take over the counter Tylenol (Acetaminophen) and/or Motrin (Ibuprofen) as directed for control of pain and/or fever.  Please follow up with your primary care doctor or specialist as needed.    If you  smoke, please stop smoking.             Sancho Knight, FNP-C   Medical Decision Making:   Urgent Care Management:  Patient's exam negative in the clinic and symptoms present for less than 48 hours, therefore I recommend supportive treatment with medication for cough and congestion and patient will monitor symptoms and return to clinic if no improvement or for any worsening of symptoms.

## (undated) DEVICE — BRIEF MESH LARGE

## (undated) DEVICE — GLOVE SURGEONS ULTRA TOUCH 6.5

## (undated) DEVICE — PACK FLUENT DISPOSABLE

## (undated) DEVICE — NDL ECLIPSE SAFETY 18GX1-1/2IN

## (undated) DEVICE — TRAP MUCUS SPECIMEN 80CC

## (undated) DEVICE — GOWN B1 X-LG X-LONG

## (undated) DEVICE — SYR B-D DISP CONTROL 10CC100/C

## (undated) DEVICE — SEAL LENS SCOPE MYOSURE

## (undated) DEVICE — GLOVE PI ULTRA TOUCH G SURGEON

## (undated) DEVICE — FORCEP BIOSY RJ 4 HOT 2.2X240

## (undated) DEVICE — DRESSING TELFA PAD N ADH 2X3

## (undated) DEVICE — PACK DRAPE PERI/GYN TIBURON

## (undated) DEVICE — SOL WATER STRL IRR 1000ML

## (undated) DEVICE — PAD SANITARY OB STERILE

## (undated) DEVICE — DRESSING ADH FILM TELFA 2X3IN

## (undated) DEVICE — KIT CANIST SUCTION 1200CC

## (undated) DEVICE — CATH 16FR URETHRL RED RUB

## (undated) DEVICE — TRAY DRY SKIN SCRUB PREP

## (undated) DEVICE — SNARE CAPTIVATOR II 25MM ROUND

## (undated) DEVICE — COVER SURG LIGHT HANDLE

## (undated) DEVICE — SEE MEDLINE ITEM 157181

## (undated) DEVICE — DEVICE MYOSURE REACH SYS

## (undated) DEVICE — SYR BULB IRRIG ST 60 LF

## (undated) DEVICE — KIT ENDO CARRY-ON PROC 100310

## (undated) DEVICE — SOL IRR NACL .9% 3000ML

## (undated) DEVICE — SOL 9P NACL IRR PIC IL

## (undated) DEVICE — GAUZE SPONGE XRAY 4X4

## (undated) DEVICE — SCRUB HIBICLENS 4% CHG 4OZ

## (undated) DEVICE — SPONGE NOVAPLUS LAP 18X18IN